# Patient Record
Sex: MALE | Race: BLACK OR AFRICAN AMERICAN | Employment: FULL TIME | URBAN - METROPOLITAN AREA
[De-identification: names, ages, dates, MRNs, and addresses within clinical notes are randomized per-mention and may not be internally consistent; named-entity substitution may affect disease eponyms.]

---

## 2019-08-22 ENCOUNTER — HOSPITAL ENCOUNTER (OUTPATIENT)
Age: 54
Discharge: HOME | End: 2019-08-22
Payer: COMMERCIAL

## 2019-08-22 ENCOUNTER — APPOINTMENT (OUTPATIENT)
Dept: GENERAL RADIOLOGY | Age: 54
End: 2019-08-22
Attending: PHYSICIAN ASSISTANT
Payer: COMMERCIAL

## 2019-08-22 VITALS
DIASTOLIC BLOOD PRESSURE: 87 MMHG | TEMPERATURE: 98 F | SYSTOLIC BLOOD PRESSURE: 152 MMHG | OXYGEN SATURATION: 98 % | WEIGHT: 257 LBS | HEART RATE: 70 BPM | HEIGHT: 71 IN | RESPIRATION RATE: 20 BRPM | BODY MASS INDEX: 35.98 KG/M2

## 2019-08-22 DIAGNOSIS — M25.462 EFFUSION OF LEFT KNEE: Primary | ICD-10-CM

## 2019-08-22 PROCEDURE — 99203 OFFICE O/P NEW LOW 30 MIN: CPT

## 2019-08-22 PROCEDURE — 73562 X-RAY EXAM OF KNEE 3: CPT | Performed by: PHYSICIAN ASSISTANT

## 2019-08-23 NOTE — ED PROVIDER NOTES
Patient Seen in: THE [facility] In [place]    History   Patient presents with:  Knee Pain    Stated Complaint: Left knee pain/2 wks    HPI    47yo male presents to clinic for evaluation of left knee pain x 2 weeks.  Pt reports slipping in showe and time. Skin: Skin is warm and dry. Capillary refill takes less than 2 seconds. Psychiatric: He has a normal mood and affect. Nursing note and vitals reviewed.                 MDM   49yo male presents wit left knee pain x 2 weeks after slipping in s

## 2019-09-29 ENCOUNTER — HOSPITAL ENCOUNTER (EMERGENCY)
Age: 54
Discharge: HOME | End: 2019-09-29
Payer: COMMERCIAL

## 2019-09-29 VITALS
TEMPERATURE: 98 F | WEIGHT: 255 LBS | OXYGEN SATURATION: 98 % | RESPIRATION RATE: 16 BRPM | DIASTOLIC BLOOD PRESSURE: 90 MMHG | HEART RATE: 73 BPM | BODY MASS INDEX: 36 KG/M2 | SYSTOLIC BLOOD PRESSURE: 141 MMHG

## 2019-09-29 DIAGNOSIS — M25.562 ARTHRALGIA OF LEFT KNEE: ICD-10-CM

## 2019-09-29 DIAGNOSIS — S90.111A CONTUSION OF RIGHT GREAT TOE WITHOUT DAMAGE TO NAIL, INITIAL ENCOUNTER: Primary | ICD-10-CM

## 2019-09-29 PROCEDURE — 96372 THER/PROPH/DIAG INJ SC/IM: CPT

## 2019-09-29 PROCEDURE — 99283 EMERGENCY DEPT VISIT LOW MDM: CPT

## 2019-09-29 RX ORDER — KETOROLAC TROMETHAMINE 30 MG/ML
30 INJECTION, SOLUTION INTRAMUSCULAR; INTRAVENOUS ONCE
Status: COMPLETED | OUTPATIENT
Start: 2019-09-29 | End: 2019-09-29

## 2019-09-29 RX ORDER — KETOROLAC TROMETHAMINE 10 MG/1
10 TABLET, FILM COATED ORAL EVERY 6 HOURS PRN
Qty: 30 TABLET | Refills: 0 | Status: SHIPPED | OUTPATIENT
Start: 2019-09-29 | End: 2019-10-06

## 2019-09-29 RX ORDER — METHYLPREDNISOLONE 4 MG/1
TABLET ORAL
Qty: 1 PACKAGE | Refills: 0 | Status: SHIPPED | OUTPATIENT
Start: 2019-09-29 | End: 2019-11-19

## 2019-09-30 NOTE — ED PROVIDER NOTES
Patient Seen in: [facility] [place]      History   Patient presents with:  Lower Extremity Injury (musculoskeletal)    Stated Complaint: toe pain     21-year-old -American male without significant past medical history anup kg/m²         Physical Exam   Constitutional: He appears well-developed and well-nourished. No distress. Musculoskeletal:        Left knee: Normal.        Left foot: There is swelling.  There is normal range of motion, no tenderness, no bony tenderness, n

## 2019-09-30 NOTE — ED INITIAL ASSESSMENT (HPI)
Pt in er for c/o pain to his right great toe and separate pain to his left knee from an injury one month ago

## 2019-11-19 ENCOUNTER — OFFICE VISIT (OUTPATIENT)
Dept: FAMILY MEDICINE CLINIC | Facility: CLINIC | Age: 54
End: 2019-11-19
Payer: COMMERCIAL

## 2019-11-19 VITALS
SYSTOLIC BLOOD PRESSURE: 122 MMHG | WEIGHT: 259.5 LBS | HEART RATE: 80 BPM | BODY MASS INDEX: 36.33 KG/M2 | TEMPERATURE: 99 F | HEIGHT: 71.06 IN | DIASTOLIC BLOOD PRESSURE: 80 MMHG

## 2019-11-19 DIAGNOSIS — M25.562 CHRONIC PAIN OF LEFT KNEE: Primary | ICD-10-CM

## 2019-11-19 DIAGNOSIS — G89.29 CHRONIC PAIN OF LEFT KNEE: Primary | ICD-10-CM

## 2019-11-19 DIAGNOSIS — Z12.11 ENCOUNTER FOR SCREENING FOR MALIGNANT NEOPLASM OF COLON: ICD-10-CM

## 2019-11-19 DIAGNOSIS — N63.22 LUMP IN UPPER INNER QUADRANT OF LEFT BREAST: ICD-10-CM

## 2019-11-19 PROCEDURE — 99203 OFFICE O/P NEW LOW 30 MIN: CPT | Performed by: FAMILY MEDICINE

## 2019-11-19 NOTE — PATIENT INSTRUCTIONS
--ibuprofen 600mg-800mg 3x/day with food consistently for 2-3 days, then only as needed for pain (do not use for prolonged period)    -- heat/ice as needed    -- stretching exercises 2-3x/day    -- continue to wear brace during the day    -- followup in

## 2019-11-19 NOTE — PROGRESS NOTES
Mohit Salazar is a 48year old male here for Patient presents with:  Knee Pain: Left knee pain, patient fell in the shower 3 months. Left knee swelling and pain      HPI:       1.  Chronic pain of left knee  -fell in shower about 3 months ago  -landed with left left breast  CV: RRR, no murmurs  Abd: soft, ND, NT, +BS  Ext: full ROM  Psych: normal affect     ASSESSMENT/PLAN:     1. Chronic pain of left knee  -heat/ice/exercises - suspect strain  -nsaids prn  -brace as needed  -f/u in 1 month, sooner prn    2.  Lump

## 2020-01-08 ENCOUNTER — OFFICE VISIT (OUTPATIENT)
Dept: FAMILY MEDICINE CLINIC | Facility: CLINIC | Age: 55
End: 2020-01-08
Payer: COMMERCIAL

## 2020-01-08 VITALS
DIASTOLIC BLOOD PRESSURE: 70 MMHG | BODY MASS INDEX: 37.1 KG/M2 | HEIGHT: 71.06 IN | TEMPERATURE: 99 F | SYSTOLIC BLOOD PRESSURE: 110 MMHG | OXYGEN SATURATION: 97 % | HEART RATE: 101 BPM | WEIGHT: 265 LBS

## 2020-01-08 DIAGNOSIS — M25.562 CHRONIC PAIN OF LEFT KNEE: Primary | ICD-10-CM

## 2020-01-08 DIAGNOSIS — E66.09 CLASS 2 OBESITY DUE TO EXCESS CALORIES WITHOUT SERIOUS COMORBIDITY WITH BODY MASS INDEX (BMI) OF 36.0 TO 36.9 IN ADULT: ICD-10-CM

## 2020-01-08 DIAGNOSIS — G89.29 CHRONIC PAIN OF LEFT KNEE: Primary | ICD-10-CM

## 2020-01-08 PROCEDURE — 99214 OFFICE O/P EST MOD 30 MIN: CPT | Performed by: FAMILY MEDICINE

## 2020-01-08 NOTE — PATIENT INSTRUCTIONS
Call insurance to find out about coverage for physical and make sure THE [facility] is in network - if not, I can change referral    Start therapy    Continue knee stretching and brace as needed    Continue to work on diet - limit sugars, soda, and carbs (bread,

## 2020-01-08 NOTE — PROGRESS NOTES
Sharyle Rock is a 48year old male here for Patient presents with:  Knee Pain: Follow up, still swelling on and off when active. HPI:       1.  Chronic pain of left knee  -improving  -only gets pain and swelling when over-exerts it  -has been going on sin on lifestyle changes at length  -weight loss will help his knee pain          The patient is asked to return in 1 sally. Orders This Visit:  No orders of the defined types were placed in this encounter.       Meds This Visit:  Requested Prescriptions

## 2020-02-04 PROBLEM — K63.5 COLON POLYP: Status: ACTIVE | Noted: 2020-02-04

## 2020-02-04 PROBLEM — Z12.11 SPECIAL SCREENING FOR MALIGNANT NEOPLASMS, COLON: Status: ACTIVE | Noted: 2020-02-04

## 2020-09-10 ENCOUNTER — VIRTUAL PHONE E/M (OUTPATIENT)
Dept: FAMILY MEDICINE CLINIC | Facility: CLINIC | Age: 55
End: 2020-09-10
Payer: COMMERCIAL

## 2020-09-10 DIAGNOSIS — R93.89 ABNORMAL X-RAY: ICD-10-CM

## 2020-09-10 DIAGNOSIS — M25.562 CHRONIC PAIN OF LEFT KNEE: Primary | ICD-10-CM

## 2020-09-10 DIAGNOSIS — M25.462 SWELLING OF JOINT OF LEFT KNEE: ICD-10-CM

## 2020-09-10 DIAGNOSIS — G89.29 CHRONIC PAIN OF LEFT KNEE: Primary | ICD-10-CM

## 2020-09-10 PROCEDURE — 99443 PHONE E/M BY PHYS 21-30 MIN: CPT | Performed by: FAMILY MEDICINE

## 2020-09-10 NOTE — PATIENT INSTRUCTIONS
Call to schedule physical therapy  If not improving after 3-4 sessions, call to schedule MRI of knee  Followup with me in 3-4 wks, sooner if needed

## 2020-09-10 NOTE — PROGRESS NOTES
Virtual/Telephone Check-In    Christian Evans is a 50year old male here today for a telemedicine audio only visit. Patient understands and accepts financial responsibility for any deductible, co-insurance and/or co-pays associated with this service.     Adalid status: Never Smoker      Smokeless tobacco: Never Used    Alcohol use: Never      Frequency: Never    Drug use: Never         Medications (Active prior to today's visit):  No current outpatient medications on file.        Allergies:  No Known Allergies

## 2020-09-17 ENCOUNTER — HOSPITAL ENCOUNTER (OUTPATIENT)
Dept: MRI IMAGING | Age: 55
Discharge: HOME | End: 2020-09-17
Attending: FAMILY MEDICINE
Payer: COMMERCIAL

## 2020-09-17 DIAGNOSIS — G89.29 CHRONIC PAIN OF LEFT KNEE: ICD-10-CM

## 2020-09-17 DIAGNOSIS — R93.89 ABNORMAL X-RAY: ICD-10-CM

## 2020-09-17 DIAGNOSIS — M25.462 SWELLING OF JOINT OF LEFT KNEE: ICD-10-CM

## 2020-09-17 DIAGNOSIS — M25.562 CHRONIC PAIN OF LEFT KNEE: ICD-10-CM

## 2020-09-17 PROCEDURE — 73721 MRI JNT OF LWR EXTRE W/O DYE: CPT | Performed by: FAMILY MEDICINE

## 2020-09-18 ENCOUNTER — TELEPHONE (OUTPATIENT)
Dept: ORTHOPEDICS CLINIC | Facility: CLINIC | Age: 55
End: 2020-09-18

## 2020-09-18 ENCOUNTER — TELEPHONE (OUTPATIENT)
Dept: FAMILY MEDICINE CLINIC | Facility: CLINIC | Age: 55
End: 2020-09-18

## 2020-09-18 DIAGNOSIS — S83.207A ACUTE TORN MENISCUS OF KNEE, LEFT, INITIAL ENCOUNTER: Primary | ICD-10-CM

## 2020-09-18 NOTE — TELEPHONE ENCOUNTER
Patient scheduled an appointment with you on Monday at 11:45am. Patient only had a MRI done, will additional imaging be needed?

## 2020-09-18 NOTE — TELEPHONE ENCOUNTER
----- Message from Yvan Huizar MD sent at 9/18/2020 11:47 AM CDT -----  MRI shows tear in meniscus     Still recommend he start PT    Also refer to ortho (dr. Omar Lowe, dx: medical meniscal tear in left knee)    Have him see ortho after 3-4 wks of PT

## 2020-09-19 ENCOUNTER — HOSPITAL ENCOUNTER (OUTPATIENT)
Dept: GENERAL RADIOLOGY | Age: 55
Discharge: HOME | End: 2020-09-19
Attending: NURSE PRACTITIONER
Payer: COMMERCIAL

## 2020-09-19 DIAGNOSIS — M25.562 LEFT KNEE PAIN, UNSPECIFIED CHRONICITY: ICD-10-CM

## 2020-09-19 PROCEDURE — 73564 X-RAY EXAM KNEE 4 OR MORE: CPT | Performed by: NURSE PRACTITIONER

## 2020-09-21 ENCOUNTER — OFFICE VISIT (OUTPATIENT)
Dept: ORTHOPEDICS CLINIC | Facility: CLINIC | Age: 55
End: 2020-09-21
Payer: COMMERCIAL

## 2020-09-21 VITALS — OXYGEN SATURATION: 98 % | RESPIRATION RATE: 16 BRPM | HEIGHT: 72 IN | HEART RATE: 95 BPM | BODY MASS INDEX: 35 KG/M2

## 2020-09-21 DIAGNOSIS — M17.12 PRIMARY OSTEOARTHRITIS OF LEFT KNEE: Primary | ICD-10-CM

## 2020-09-21 DIAGNOSIS — S83.222A PERIPHERAL TEAR OF MEDIAL MENISCUS OF LEFT KNEE AS CURRENT INJURY, INITIAL ENCOUNTER: ICD-10-CM

## 2020-09-21 PROCEDURE — 20610 DRAIN/INJ JOINT/BURSA W/O US: CPT | Performed by: NURSE PRACTITIONER

## 2020-09-21 PROCEDURE — 99203 OFFICE O/P NEW LOW 30 MIN: CPT | Performed by: NURSE PRACTITIONER

## 2020-09-21 RX ORDER — TRIAMCINOLONE ACETONIDE 40 MG/ML
40 INJECTION, SUSPENSION INTRA-ARTICULAR; INTRAMUSCULAR ONCE
Status: COMPLETED | OUTPATIENT
Start: 2020-09-21 | End: 2020-09-21

## 2020-09-21 RX ORDER — DICLOFENAC SODIUM 75 MG/1
75 TABLET, DELAYED RELEASE ORAL 2 TIMES DAILY
Qty: 60 TABLET | Refills: 1 | Status: SHIPPED | OUTPATIENT
Start: 2020-09-21 | End: 2021-02-16

## 2020-09-21 RX ADMIN — TRIAMCINOLONE ACETONIDE 40 MG: 40 INJECTION, SUSPENSION INTRA-ARTICULAR; INTRAMUSCULAR at 12:26:00

## 2020-09-21 NOTE — PROCEDURES
After informed consent, the patient's left knee was marked, locally anesthetized with skin refrigerant, prepped with topical antiseptic, and injected with a mixture of 1mL 40mg/mL Kenalog, 2mL 1% lidocaine and 2mL 0.5% marcaine through the inferolateral po

## 2020-09-21 NOTE — PROGRESS NOTES
[facility] New Patient Note    CC: Patient presents with:  Knee Pain: Left knee torn meniscus 2 years ago     HPI: This 50year old male presents today with complaints of left knee pain for the past 2 years.   He states that he was staying at a hotel distally. IMAGING  Radiographs were personally reviewed that show mild arthritic changes in the patellofemoral compartment. MRI was also personally viewed that show a small hole posterior medial meniscus tear.     ASSESSMENT  Left knee medial meniscus t

## 2020-09-23 ENCOUNTER — VIRTUAL PHONE E/M (OUTPATIENT)
Dept: FAMILY MEDICINE CLINIC | Facility: CLINIC | Age: 55
End: 2020-09-23
Payer: COMMERCIAL

## 2020-09-23 DIAGNOSIS — M25.562 CHRONIC PAIN OF LEFT KNEE: ICD-10-CM

## 2020-09-23 DIAGNOSIS — S83.207A ACUTE TORN MENISCUS OF KNEE, LEFT, INITIAL ENCOUNTER: Primary | ICD-10-CM

## 2020-09-23 DIAGNOSIS — G89.29 CHRONIC PAIN OF LEFT KNEE: ICD-10-CM

## 2020-09-23 PROCEDURE — 99443 PHONE E/M BY PHYS 21-30 MIN: CPT | Performed by: FAMILY MEDICINE

## 2020-09-23 NOTE — PROGRESS NOTES
Virtual/Telephone Check-In    Cristóbal Resendez is a 50year old male here today for a telemedicine audio only visit. Patient understands and accepts financial responsibility for any deductible, co-insurance and/or co-pays associated with this service.     Adalid Oral Tab EC Take 1 tablet (75 mg total) by mouth 2 (two) times daily.  60 tablet 1       Allergies:  No Known Allergies      ROS:     --See HPI for relevant ROS  --GEN: Denies  --HEENT: Denies  --RESP: Denies  --CV: Denies  --GI: Denies  --: Denies  --MSK

## 2020-09-24 ENCOUNTER — OFFICE VISIT (OUTPATIENT)
Dept: PHYSICAL THERAPY | Age: 55
End: 2020-09-24
Attending: NURSE PRACTITIONER
Payer: COMMERCIAL

## 2020-09-24 DIAGNOSIS — S83.222A PERIPHERAL TEAR OF MEDIAL MENISCUS OF LEFT KNEE AS CURRENT INJURY, INITIAL ENCOUNTER: ICD-10-CM

## 2020-09-24 DIAGNOSIS — M17.12 PRIMARY OSTEOARTHRITIS OF LEFT KNEE: ICD-10-CM

## 2020-09-24 PROCEDURE — 97161 PT EVAL LOW COMPLEX 20 MIN: CPT

## 2020-09-24 PROCEDURE — 97112 NEUROMUSCULAR REEDUCATION: CPT

## 2020-09-24 NOTE — PROGRESS NOTES
KNEE EVALUATION:   Referring Physician: Dr. Lorri Cantu  Diagnosis: Primary osteoarthritis of left knee   Peripheral tear of medial meniscus of left knee as current injury, initial encounter      Date of Service: 9/24/2020     PATIENT SUMMARY   Osman Curtis is understanding and performs HEP correctly without reported pain. Pt has an increase in HS flexibility with core activation indicating reduced trunk control and neurological component to HS flexibility.  It is medically necessary for pt to continue PT to reac to actively participate in planning and for this course of care. Thank you for your referral. Please co-sign or sign and return this letter via fax as soon as possible to [phone].  If you have any questions, please contact me at Dept: [phone]

## 2020-09-29 ENCOUNTER — OFFICE VISIT (OUTPATIENT)
Dept: PHYSICAL THERAPY | Age: 55
End: 2020-09-29
Attending: NURSE PRACTITIONER
Payer: COMMERCIAL

## 2020-09-29 PROCEDURE — 97110 THERAPEUTIC EXERCISES: CPT

## 2020-09-29 PROCEDURE — 97112 NEUROMUSCULAR REEDUCATION: CPT

## 2020-09-29 PROCEDURE — 97140 MANUAL THERAPY 1/> REGIONS: CPT

## 2020-09-29 NOTE — PROGRESS NOTES
Dx: Primary osteoarthritis of left knee   Peripheral tear of medial meniscus of left knee as current injury, initial encounter [facility] (Authorized # of Visits):  6           Authorizing Physician: Dr. Mary Martinez  Next MD visit: none scheduled

## 2020-10-01 ENCOUNTER — OFFICE VISIT (OUTPATIENT)
Dept: PHYSICAL THERAPY | Age: 55
End: 2020-10-01
Attending: NURSE PRACTITIONER
Payer: COMMERCIAL

## 2020-10-01 PROCEDURE — 97110 THERAPEUTIC EXERCISES: CPT

## 2020-10-01 PROCEDURE — 97140 MANUAL THERAPY 1/> REGIONS: CPT

## 2020-10-01 NOTE — PROGRESS NOTES
Dx: Primary osteoarthritis of left knee   Peripheral tear of medial meniscus of left knee as current injury, initial encounter [facility] (Authorized # of Visits):  6           Authorizing Physician: Dr. Starr Urrutia  Next MD visit: none scheduled therex x2       Total Timed Treatment: 45 min  Total Treatment Time: 45 min

## 2020-10-06 ENCOUNTER — OFFICE VISIT (OUTPATIENT)
Dept: PHYSICAL THERAPY | Age: 55
End: 2020-10-06
Attending: NURSE PRACTITIONER
Payer: COMMERCIAL

## 2020-10-06 PROCEDURE — 97140 MANUAL THERAPY 1/> REGIONS: CPT

## 2020-10-06 PROCEDURE — 97110 THERAPEUTIC EXERCISES: CPT

## 2020-10-06 NOTE — PROGRESS NOTES
Dx: Primary osteoarthritis of left knee   Peripheral tear of medial meniscus of left knee as current injury, initial encounter [facility] (Authorized # of Visits):  6           Authorizing Physician: Dr. Juan Antonio Quiñonez  Next MD visit: none scheduled  F massage   HS STM  Contract relax quad Manual   Quad functional massage   HS STM  myobuddy HS     HEP: prone quad stretch 3x30s, HS stretch 3x30s, SB TA 3sx20, SB bridge 2x10:    Charges: Manual x1, therex x2       Total Timed Treatment: 45 min  Total Treat

## 2020-10-08 ENCOUNTER — TELEPHONE (OUTPATIENT)
Dept: PHYSICAL THERAPY | Age: 55
End: 2020-10-08

## 2020-10-08 ENCOUNTER — APPOINTMENT (OUTPATIENT)
Dept: PHYSICAL THERAPY | Age: 55
End: 2020-10-08
Attending: NURSE PRACTITIONER
Payer: COMMERCIAL

## 2020-10-12 ENCOUNTER — TELEPHONE (OUTPATIENT)
Dept: PHYSICAL THERAPY | Facility: HOSPITAL | Age: 55
End: 2020-10-12

## 2020-10-15 ENCOUNTER — OFFICE VISIT (OUTPATIENT)
Dept: PHYSICAL THERAPY | Age: 55
End: 2020-10-15
Attending: FAMILY MEDICINE
Payer: COMMERCIAL

## 2020-10-15 ENCOUNTER — APPOINTMENT (OUTPATIENT)
Dept: PHYSICAL THERAPY | Age: 55
End: 2020-10-15
Attending: NURSE PRACTITIONER
Payer: COMMERCIAL

## 2020-10-15 PROCEDURE — 97110 THERAPEUTIC EXERCISES: CPT

## 2020-10-15 PROCEDURE — 97140 MANUAL THERAPY 1/> REGIONS: CPT

## 2020-10-15 NOTE — PROGRESS NOTES
Dx: Primary osteoarthritis of left knee   Peripheral tear of medial meniscus of left knee as current injury, initial encounter [facility] (Authorized # of Visits):  6           Authorizing Physician: Dr. Samina Leon  Next MD visit: none scheduled  F 10# 4min Foam roll quads 25x2    - - - Kneeling SB planks 15s x4    Heel raises with quad sets 2x10 (next visit) Shuttle DLP 6c 20x  Lateral cable walks 7# 3x ea  Shuttle DLP 6c 3x10    Manual   Quad functional massage   HS STM Manual   Quad functional mas

## 2020-10-20 ENCOUNTER — OFFICE VISIT (OUTPATIENT)
Dept: PHYSICAL THERAPY | Age: 55
End: 2020-10-20
Attending: FAMILY MEDICINE
Payer: COMMERCIAL

## 2020-10-20 PROCEDURE — 97110 THERAPEUTIC EXERCISES: CPT

## 2020-10-20 PROCEDURE — 97140 MANUAL THERAPY 1/> REGIONS: CPT

## 2020-10-20 NOTE — PROGRESS NOTES
Dx: Primary osteoarthritis of left knee   Peripheral tear of medial meniscus of left knee as current injury, initial encounter [facility] (Authorized # of Visits):  6           Authorizing Physician: Dr. Pacheco All  Next MD visit: none scheduled  F planks 15s x4 Kneeling SB planks 15s x6   Heel raises with quad sets 2x10 (next visit) Shuttle DLP 6c 20x  Lateral cable walks 7# 3x ea  Shuttle DLP 6c 3x10 SB wall squats x10, TRX squats 10x   Manual   Quad functional massage   HS STM Manual   Quad functi

## 2020-10-27 ENCOUNTER — TELEPHONE (OUTPATIENT)
Dept: PHYSICAL THERAPY | Age: 55
End: 2020-10-27

## 2020-10-27 ENCOUNTER — APPOINTMENT (OUTPATIENT)
Dept: PHYSICAL THERAPY | Age: 55
End: 2020-10-27
Attending: FAMILY MEDICINE
Payer: COMMERCIAL

## 2020-11-02 ENCOUNTER — OFFICE VISIT (OUTPATIENT)
Dept: ORTHOPEDICS CLINIC | Facility: CLINIC | Age: 55
End: 2020-11-02
Payer: COMMERCIAL

## 2020-11-02 VITALS — RESPIRATION RATE: 16 BRPM | OXYGEN SATURATION: 99 % | HEIGHT: 72 IN | HEART RATE: 88 BPM | BODY MASS INDEX: 35 KG/M2

## 2020-11-02 DIAGNOSIS — S83.222A PERIPHERAL TEAR OF MEDIAL MENISCUS OF LEFT KNEE AS CURRENT INJURY, INITIAL ENCOUNTER: ICD-10-CM

## 2020-11-02 DIAGNOSIS — M17.12 PRIMARY OSTEOARTHRITIS OF LEFT KNEE: Primary | ICD-10-CM

## 2020-11-02 PROCEDURE — 99213 OFFICE O/P EST LOW 20 MIN: CPT | Performed by: NURSE PRACTITIONER

## 2020-11-02 NOTE — PROGRESS NOTES
[facility] Progress Note    CC: Patient presents with: Follow - Up: Left knee pain     HPI: This 50year old male presents today to follow-up on his left knee.   At his previous appointment we gave him a corticosteroid injection he states that this g

## 2020-11-03 ENCOUNTER — OFFICE VISIT (OUTPATIENT)
Dept: PHYSICAL THERAPY | Age: 55
End: 2020-11-03
Attending: FAMILY MEDICINE
Payer: COMMERCIAL

## 2020-11-03 PROCEDURE — 97110 THERAPEUTIC EXERCISES: CPT

## 2020-11-03 PROCEDURE — 97140 MANUAL THERAPY 1/> REGIONS: CPT

## 2020-11-03 NOTE — PROGRESS NOTES
Dx: Primary osteoarthritis of left knee   Peripheral tear of medial meniscus of left knee as current injury, initial encounter [facility] (Authorized # of Visits):  6           Authorizing Physician: Dr. Pacheco All  Next MD visit: none scheduled  F TF distraction with ankle weight 5# 4min TF distraction with ankle weight 10# 4min TF distraction with ankle weight 10# 4min Foam roll quads 25x2 Foam roll quads 25x2 Foam roll quads 25x2   - - - Kneeling SB planks 15s x4 Kneeling SB planks 15s x6 Kneeli

## 2020-11-16 ENCOUNTER — OFFICE VISIT (OUTPATIENT)
Dept: PHYSICAL THERAPY | Age: 55
End: 2020-11-16
Attending: FAMILY MEDICINE
Payer: COMMERCIAL

## 2020-11-16 PROCEDURE — 97112 NEUROMUSCULAR REEDUCATION: CPT

## 2020-11-16 NOTE — PROGRESS NOTES
Discharge Summary  Pt has attended 8 visits in Physical Therapy.        Dx: Primary osteoarthritis of left knee   Peripheral tear of medial meniscus of left knee as current injury, initial encounter [employer] [employer] (Authorized # of Visits):  8 1 hour. NOT MET Pt states he can drive for 45 min. Pt will increase quad and HS flexibility to min restrict to pivot while walking. NOT MET      Plan: Discharge, pt encouraged to follow up with MD and will continue HEP independently at this time.      Yolis Escobar Shuttle DLP 6c 20x  Lateral cable walks 7# 3x ea  Shuttle DLP 6c 3x10 SB wall squats x10, TRX squats 10x SB wall squats x10, TRX squats 10x Reassessment and pt education x25 min   Manual   Quad functional massage   HS STM Manual   Quad functional massage

## 2020-12-07 ENCOUNTER — HOSPITAL ENCOUNTER (OUTPATIENT)
Dept: GENERAL RADIOLOGY | Age: 55
Discharge: HOME | End: 2020-12-07
Attending: NURSE PRACTITIONER
Payer: OTHER MISCELLANEOUS

## 2020-12-07 ENCOUNTER — OFFICE VISIT (OUTPATIENT)
Dept: ORTHOPEDICS CLINIC | Facility: CLINIC | Age: 55
End: 2020-12-07
Payer: COMMERCIAL

## 2020-12-07 VITALS — HEIGHT: 78 IN | OXYGEN SATURATION: 98 % | HEART RATE: 63 BPM | RESPIRATION RATE: 16 BRPM | BODY MASS INDEX: 30 KG/M2

## 2020-12-07 DIAGNOSIS — M25.561 RIGHT KNEE PAIN, UNSPECIFIED CHRONICITY: Primary | ICD-10-CM

## 2020-12-07 DIAGNOSIS — M23.91 ACUTE INTERNAL DERANGEMENT OF RIGHT KNEE: ICD-10-CM

## 2020-12-07 DIAGNOSIS — S83.241A TEAR OF MEDIAL MENISCUS OF RIGHT KNEE, CURRENT, UNSPECIFIED TEAR TYPE, INITIAL ENCOUNTER: ICD-10-CM

## 2020-12-07 DIAGNOSIS — M25.561 RIGHT KNEE PAIN, UNSPECIFIED CHRONICITY: ICD-10-CM

## 2020-12-07 PROCEDURE — 20610 DRAIN/INJ JOINT/BURSA W/O US: CPT | Performed by: NURSE PRACTITIONER

## 2020-12-07 PROCEDURE — 99213 OFFICE O/P EST LOW 20 MIN: CPT | Performed by: NURSE PRACTITIONER

## 2020-12-07 PROCEDURE — 73564 X-RAY EXAM KNEE 4 OR MORE: CPT | Performed by: NURSE PRACTITIONER

## 2020-12-07 RX ORDER — TRIAMCINOLONE ACETONIDE 40 MG/ML
40 INJECTION, SUSPENSION INTRA-ARTICULAR; INTRAMUSCULAR ONCE
Status: COMPLETED | OUTPATIENT
Start: 2020-12-07 | End: 2020-12-07

## 2020-12-07 RX ADMIN — TRIAMCINOLONE ACETONIDE 40 MG: 40 INJECTION, SUSPENSION INTRA-ARTICULAR; INTRAMUSCULAR at 12:21:00

## 2020-12-07 NOTE — PROGRESS NOTES
[facility] New Patient Note    CC: Patient presents with:  Knee Pain: Right knee pain     HPI: This 50year old male presents today with complaints of right knee pain after an injury on 12/1/2020 while he was at work.   He states he was loading boxes neurovascular intact distally.     IMAGING  Radiographs were personally reviewed today that were negative for an acute fracture and he does have mild amount of degenerative changes in the medial and patellofemoral compartment    ASSESSMENT  Right knee inter

## 2021-01-25 ENCOUNTER — OFFICE VISIT (OUTPATIENT)
Dept: ORTHOPEDICS CLINIC | Facility: CLINIC | Age: 56
End: 2021-01-25
Payer: COMMERCIAL

## 2021-01-25 VITALS — RESPIRATION RATE: 16 BRPM | HEIGHT: 78 IN | HEART RATE: 60 BPM | BODY MASS INDEX: 30 KG/M2 | OXYGEN SATURATION: 99 %

## 2021-01-25 DIAGNOSIS — S83.241A TEAR OF MEDIAL MENISCUS OF RIGHT KNEE, CURRENT, UNSPECIFIED TEAR TYPE, INITIAL ENCOUNTER: Primary | ICD-10-CM

## 2021-01-25 DIAGNOSIS — M25.561 RIGHT KNEE PAIN, UNSPECIFIED CHRONICITY: ICD-10-CM

## 2021-01-25 DIAGNOSIS — M23.91 ACUTE INTERNAL DERANGEMENT OF RIGHT KNEE: ICD-10-CM

## 2021-01-25 PROCEDURE — 99213 OFFICE O/P EST LOW 20 MIN: CPT | Performed by: NURSE PRACTITIONER

## 2021-01-25 NOTE — PROGRESS NOTES
[facility] Progress Note    CC: Patient presents with:  Knee Pain: RIGHT KNEE PAIN     HPI: This 50year old male presents today for follow-up on his right knee pain.   We have previously gave him a corticosteroid injection which he states gave him 7 He is tried conservative treatment in the form of anti-inflammatory medicine, physical therapy, and corticosteroid injection.   Unfortunately he is recalcitrant to nonoperative treatment and at this point I would recommend he be evaluated by Dr. Dao Langford for fu

## 2021-02-01 ENCOUNTER — OFFICE VISIT (OUTPATIENT)
Dept: ORTHOPEDICS CLINIC | Facility: CLINIC | Age: 56
End: 2021-02-01
Payer: COMMERCIAL

## 2021-02-01 DIAGNOSIS — S83.241A TEAR OF MEDIAL MENISCUS OF RIGHT KNEE, CURRENT, UNSPECIFIED TEAR TYPE, INITIAL ENCOUNTER: Primary | ICD-10-CM

## 2021-02-01 DIAGNOSIS — M94.261 CHONDROMALACIA, KNEE, RIGHT: ICD-10-CM

## 2021-02-01 PROCEDURE — 99213 OFFICE O/P EST LOW 20 MIN: CPT | Performed by: ORTHOPAEDIC SURGERY

## 2021-02-01 NOTE — PROGRESS NOTES
[facility] New Patient consult note    CC: Patient presents with:  Knee Pain: right knee pain/xrays and MRI at THE [facility]    HPI: The patient is a 50year old male [employer] employee who presents today with complaints of persistent and limiting right k There were no vitals taken for this visit. She is afebrile on Covid screening. On examination he is a 62-year-old black male who weighs 261 pounds. Gait is nonantalgic and a soft Montejo brace is removed from the right knee.   There is no apparent effu including but not limited to possible cardiac, pulmonary, or cerebrovascular complications, any of which could be life-threatening. Given the patients good health history, the risk of the serious complications is felt to be low but not zero.   Pre-operativ

## 2021-02-03 ENCOUNTER — TELEPHONE (OUTPATIENT)
Dept: ORTHOPEDICS CLINIC | Facility: CLINIC | Age: 56
End: 2021-02-03

## 2021-02-03 ENCOUNTER — MED REC SCAN ONLY (OUTPATIENT)
Dept: ORTHOPEDICS CLINIC | Facility: CLINIC | Age: 56
End: 2021-02-03

## 2021-02-03 NOTE — TELEPHONE ENCOUNTER
Patient called in regards surgery. Patient would like  to be re- scheduled for surgery if possible. Patient would like something sooner. Please call patient to discuss surgery.

## 2021-02-16 ENCOUNTER — OFFICE VISIT (OUTPATIENT)
Dept: FAMILY MEDICINE CLINIC | Facility: CLINIC | Age: 56
End: 2021-02-16
Payer: OTHER MISCELLANEOUS

## 2021-02-16 VITALS
SYSTOLIC BLOOD PRESSURE: 98 MMHG | WEIGHT: 240 LBS | HEART RATE: 88 BPM | BODY MASS INDEX: 33.23 KG/M2 | TEMPERATURE: 98 F | HEIGHT: 71.1 IN | DIASTOLIC BLOOD PRESSURE: 60 MMHG | OXYGEN SATURATION: 98 %

## 2021-02-16 DIAGNOSIS — R73.9 HYPERGLYCEMIA: ICD-10-CM

## 2021-02-16 DIAGNOSIS — S89.91XA INJURY OF RIGHT KNEE, INITIAL ENCOUNTER: ICD-10-CM

## 2021-02-16 DIAGNOSIS — Z12.5 PROSTATE CANCER SCREENING: ICD-10-CM

## 2021-02-16 DIAGNOSIS — Z01.818 PREOPERATIVE CLEARANCE: Primary | ICD-10-CM

## 2021-02-16 DIAGNOSIS — Z00.00 ROUTINE GENERAL MEDICAL EXAMINATION AT A HEALTH CARE FACILITY: Primary | ICD-10-CM

## 2021-02-16 DIAGNOSIS — R71.8 LOW MEAN CORPUSCULAR VOLUME (MCV): ICD-10-CM

## 2021-02-16 PROCEDURE — 3078F DIAST BP <80 MM HG: CPT | Performed by: FAMILY MEDICINE

## 2021-02-16 PROCEDURE — 93000 ELECTROCARDIOGRAM COMPLETE: CPT | Performed by: FAMILY MEDICINE

## 2021-02-16 PROCEDURE — 3074F SYST BP LT 130 MM HG: CPT | Performed by: FAMILY MEDICINE

## 2021-02-16 PROCEDURE — 3008F BODY MASS INDEX DOCD: CPT | Performed by: FAMILY MEDICINE

## 2021-02-16 PROCEDURE — 99243 OFF/OP CNSLTJ NEW/EST LOW 30: CPT | Performed by: FAMILY MEDICINE

## 2021-02-16 RX ORDER — DICLOFENAC SODIUM 75 MG/1
75 TABLET, DELAYED RELEASE ORAL 2 TIMES DAILY PRN
Qty: 60 TABLET | Refills: 1 | Status: SHIPPED | OUTPATIENT
Start: 2021-02-16 | End: 2021-04-12

## 2021-02-16 NOTE — PATIENT INSTRUCTIONS
Go to [facility] for fasting bloodwork    Stop diclofenac 1 week before surgery    Call surgeon with any questions    Followup with me 4 wks after surgery

## 2021-02-16 NOTE — PROGRESS NOTES
Patient presents with:  Pre-Op Exam: On 03/01/2021 patient is having Right knee arthroscopy with partial medial meniscectomy and chondroplasty with Jay Jay Hobbs       PREOPERATIVE HISTORY AND PHYSICAL     Pre-op clearance requested by:  Dr. Syed Hope  Proposed s Relationships      Social connections        Talks on phone: Not on file        Gets together: Not on file        Attends Anabaptism service: Not on file        Active member of club or organization: Not on file        Attends meetings of clubs or organizat diabetes - will address this prior to full surgical clearance  -EKG unremarkable      Will fax note to criss and Dr. Pio Strong

## 2021-02-19 PROCEDURE — 3046F HEMOGLOBIN A1C LEVEL >9.0%: CPT | Performed by: FAMILY MEDICINE

## 2021-02-22 ENCOUNTER — TELEPHONE (OUTPATIENT)
Dept: ORTHOPEDICS CLINIC | Facility: CLINIC | Age: 56
End: 2021-02-22

## 2021-02-22 LAB — TOTAL PSA: 0.4 NG/ML

## 2021-02-22 NOTE — TELEPHONE ENCOUNTER
Spoke with Tera Fuentes and let him know that the hospital will call and coordinate this to be done about 72 hours in advance to surgery.

## 2021-02-22 NOTE — TELEPHONE ENCOUNTER
Pt has RT KNEE SX w/ [address] 03.01 and is wanting to confirm how to schedule covid test since he has not been contacted yet.     Pt can be reached @ [phone]

## 2021-02-22 NOTE — TELEPHONE ENCOUNTER
Surgeon: Dr. Ada Urias  Date of Surgery: 3/2/2021  Facility: [facility]       If [facility], scheduling sheet to referral team?: n/a  Clearance needed: yes       If yes, requested?: yes  Post-op Appt: 3/11/2021 4:00PM

## 2021-02-23 ENCOUNTER — TELEPHONE (OUTPATIENT)
Dept: FAMILY MEDICINE CLINIC | Facility: CLINIC | Age: 56
End: 2021-02-23

## 2021-02-23 LAB
% SATURATION: 37 % (CALC) (ref 20–48)
ABSOLUTE BASOPHILS: 32 CELLS/UL (ref 0–200)
ABSOLUTE EOSINOPHILS: 69 CELLS/UL (ref 15–500)
ABSOLUTE LYMPHOCYTES: 2056 CELLS/UL (ref 850–3900)
ABSOLUTE MONOCYTES: 472 CELLS/UL (ref 200–950)
ABSOLUTE NEUTROPHILS: 2671 CELLS/UL (ref 1500–7800)
ALBUMIN/GLOBULIN RATIO: 1.4 (CALC) (ref 1–2.5)
ALBUMIN: 4.2 G/DL (ref 3.6–5.1)
ALKALINE PHOSPHATASE: 75 U/L (ref 35–144)
ALT: 10 U/L (ref 9–46)
AST: 10 U/L (ref 10–35)
BASOPHILS: 0.6 %
BILIRUBIN, TOTAL: 0.7 MG/DL (ref 0.2–1.2)
BUN: 12 MG/DL (ref 7–25)
CALCIUM: 10.3 MG/DL (ref 8.6–10.3)
CARBON DIOXIDE: 32 MMOL/L (ref 20–32)
CHLORIDE: 100 MMOL/L (ref 98–110)
CHOL/HDLC RATIO: 5.3 (CALC)
CHOLESTEROL, TOTAL: 192 MG/DL
CREATININE: 1.16 MG/DL (ref 0.7–1.33)
EGFR IF AFRICN AM: 83 ML/MIN/1.73M2
EGFR IF NONAFRICN AM: 71 ML/MIN/1.73M2
EOSINOPHILS: 1.3 %
FERRITIN: 190 NG/ML (ref 38–380)
GLOBULIN: 3.1 G/DL (CALC) (ref 1.9–3.7)
GLUCOSE: 226 MG/DL (ref 65–99)
HDL CHOLESTEROL: 36 MG/DL
HEMATOCRIT: 42.2 % (ref 38.5–50)
HEMOGLOBIN A1C: 11.4 % OF TOTAL HGB
HEMOGLOBIN: 13.5 G/DL (ref 13.2–17.1)
IRON BINDING CAPACITY: 336 MCG/DL (CALC) (ref 250–425)
IRON, TOTAL: 125 MCG/DL (ref 50–180)
LDL-CHOLESTEROL: 137 MG/DL (CALC)
LYMPHOCYTES: 38.8 %
MCH: 23.6 PG (ref 27–33)
MCHC: 32 G/DL (ref 32–36)
MCV: 73.9 FL (ref 80–100)
MONOCYTES: 8.9 %
MPV: 10.6 FL (ref 7.5–12.5)
NEUTROPHILS: 50.4 %
NON-HDL CHOLESTEROL: 156 MG/DL (CALC)
PLATELET COUNT: 288 THOUSAND/UL (ref 140–400)
POTASSIUM: 4.3 MMOL/L (ref 3.5–5.3)
PROTEIN, TOTAL: 7.3 G/DL (ref 6.1–8.1)
RDW: 13.7 % (ref 11–15)
RED BLOOD CELL COUNT: 5.71 MILLION/UL (ref 4.2–5.8)
SODIUM: 137 MMOL/L (ref 135–146)
TRIGLYCERIDES: 91 MG/DL
TSH W/REFLEX TO FT4: 1.94 MIU/L (ref 0.4–4.5)
WHITE BLOOD CELL COUNT: 5.3 THOUSAND/UL (ref 3.8–10.8)

## 2021-02-23 RX ORDER — LANCETS 28 GAUGE
1 EACH MISCELLANEOUS DAILY
Qty: 1 BOX | Refills: 1 | Status: SHIPPED | OUTPATIENT
Start: 2021-02-23 | End: 2021-03-15

## 2021-02-23 RX ORDER — BLOOD-GLUCOSE METER
1 KIT MISCELLANEOUS DAILY
Qty: 1 KIT | Refills: 0 | Status: SHIPPED | OUTPATIENT
Start: 2021-02-23 | End: 2022-02-23

## 2021-02-23 RX ORDER — METFORMIN HYDROCHLORIDE 500 MG/1
TABLET, EXTENDED RELEASE ORAL
Qty: 180 TABLET | Refills: 1 | Status: SHIPPED | OUTPATIENT
Start: 2021-02-23 | End: 2021-03-11

## 2021-02-23 RX ORDER — BLOOD-GLUCOSE METER
KIT MISCELLANEOUS
Qty: 100 STRIP | Refills: 0 | Status: SHIPPED | OUTPATIENT
Start: 2021-02-23 | End: 2021-03-15

## 2021-02-23 NOTE — TELEPHONE ENCOUNTER
Called patient regarding new diagnosis of DM - elevated sugars on preop screening    A1c came back at 11.4    Plan:   -he will cut out soda and sugars  -limit carbs as much as possible  -increase activity  -start metformin as prescribed  -check fasting sug

## 2021-02-26 ENCOUNTER — TELEPHONE (OUTPATIENT)
Dept: ORTHOPEDICS CLINIC | Facility: CLINIC | Age: 56
End: 2021-02-26

## 2021-02-26 NOTE — TELEPHONE ENCOUNTER
Mona Ogden from [employer] called regarding pre-auth for patient's surgery. She is also looking for medical records as she has not received any from patient's visits. Patient WC claim #[id number], date of injury is 12/2/20.

## 2021-03-11 ENCOUNTER — TELEMEDICINE (OUTPATIENT)
Dept: FAMILY MEDICINE CLINIC | Facility: CLINIC | Age: 56
End: 2021-03-11
Payer: COMMERCIAL

## 2021-03-11 DIAGNOSIS — E11.9 TYPE 2 DIABETES MELLITUS WITHOUT COMPLICATION, WITHOUT LONG-TERM CURRENT USE OF INSULIN (HCC): Primary | ICD-10-CM

## 2021-03-11 DIAGNOSIS — G89.29 CHRONIC PAIN OF LEFT KNEE: ICD-10-CM

## 2021-03-11 DIAGNOSIS — M25.562 CHRONIC PAIN OF LEFT KNEE: ICD-10-CM

## 2021-03-11 DIAGNOSIS — E66.09 CLASS 2 OBESITY DUE TO EXCESS CALORIES WITHOUT SERIOUS COMORBIDITY WITH BODY MASS INDEX (BMI) OF 36.0 TO 36.9 IN ADULT: ICD-10-CM

## 2021-03-11 PROCEDURE — 99214 OFFICE O/P EST MOD 30 MIN: CPT | Performed by: FAMILY MEDICINE

## 2021-03-11 RX ORDER — METFORMIN HYDROCHLORIDE 750 MG/1
1500 TABLET, EXTENDED RELEASE ORAL
Qty: 180 TABLET | Refills: 1 | Status: SHIPPED | OUTPATIENT
Start: 2021-03-11 | End: 2021-11-18

## 2021-03-11 NOTE — PROGRESS NOTES
Virtual/Telephone Check-In    Pastor Post is a 50year old male here today for a telemedicine audio and video visit. HPI:       1.  Type 2 diabetes mellitus without complication, without long-term current use of insulin (HCC)  2. Class 2 obesity Attack Maternal Grandmother    • Stroke Maternal Grandmother    • Diabetes Paternal Grandfather       Social History: Social History    Tobacco Use      Smoking status: Never Smoker      Smokeless tobacco: Never Used    Vaping Use      Vaping Use: Never us of the limitations of the telehealth visit, including treatment limitations as no physical exam could be performed. The patient was advised to call 911 or to go to the ER in case there was an emergency.   The patient was also advised of the potential priva

## 2021-03-15 ENCOUNTER — TELEPHONE (OUTPATIENT)
Dept: FAMILY MEDICINE CLINIC | Facility: CLINIC | Age: 56
End: 2021-03-15

## 2021-03-15 RX ORDER — BLOOD-GLUCOSE METER
KIT MISCELLANEOUS
Qty: 100 STRIP | Refills: 1 | Status: SHIPPED | OUTPATIENT
Start: 2021-03-15

## 2021-03-15 RX ORDER — LANCETS 28 GAUGE
1 EACH MISCELLANEOUS DAILY
Qty: 1 BOX | Refills: 1 | Status: SHIPPED | OUTPATIENT
Start: 2021-03-15 | End: 2022-03-15

## 2021-03-15 NOTE — TELEPHONE ENCOUNTER
Pt called states he thought on 3/11 he was getting a refill on his freestyle lancets and strips.  Pt states he only has two strips left and about 10 lancets needs refills sent to his [facility].

## 2021-04-01 ENCOUNTER — MED REC SCAN ONLY (OUTPATIENT)
Dept: ORTHOPEDICS CLINIC | Facility: CLINIC | Age: 56
End: 2021-04-01

## 2021-04-05 ENCOUNTER — TELEPHONE (OUTPATIENT)
Dept: ORTHOPEDICS CLINIC | Facility: CLINIC | Age: 56
End: 2021-04-05

## 2021-04-05 ENCOUNTER — OFFICE VISIT (OUTPATIENT)
Dept: FAMILY MEDICINE CLINIC | Facility: CLINIC | Age: 56
End: 2021-04-05
Payer: COMMERCIAL

## 2021-04-05 VITALS
HEART RATE: 80 BPM | HEIGHT: 71 IN | OXYGEN SATURATION: 98 % | WEIGHT: 234 LBS | BODY MASS INDEX: 32.76 KG/M2 | TEMPERATURE: 97 F | SYSTOLIC BLOOD PRESSURE: 100 MMHG | DIASTOLIC BLOOD PRESSURE: 60 MMHG

## 2021-04-05 DIAGNOSIS — E11.9 TYPE 2 DIABETES MELLITUS WITHOUT COMPLICATION, WITHOUT LONG-TERM CURRENT USE OF INSULIN (HCC): Primary | ICD-10-CM

## 2021-04-05 DIAGNOSIS — G89.29 CHRONIC PAIN OF LEFT KNEE: ICD-10-CM

## 2021-04-05 DIAGNOSIS — M25.562 CHRONIC PAIN OF LEFT KNEE: ICD-10-CM

## 2021-04-05 DIAGNOSIS — S89.91XA INJURY OF RIGHT KNEE, INITIAL ENCOUNTER: ICD-10-CM

## 2021-04-05 PROCEDURE — 99214 OFFICE O/P EST MOD 30 MIN: CPT | Performed by: FAMILY MEDICINE

## 2021-04-05 PROCEDURE — 3008F BODY MASS INDEX DOCD: CPT | Performed by: FAMILY MEDICINE

## 2021-04-05 PROCEDURE — 3074F SYST BP LT 130 MM HG: CPT | Performed by: FAMILY MEDICINE

## 2021-04-05 PROCEDURE — 3078F DIAST BP <80 MM HG: CPT | Performed by: FAMILY MEDICINE

## 2021-04-05 NOTE — PROGRESS NOTES
Delia De Jesus is a 50year old male here for Patient presents with:  Diabetes      HPI:       1.  Type 2 diabetes mellitus without complication, without long-term current use of insulin (HCC)  -has lost an additional 6 lbs since last visit  -continues t Allergies:  No Known Allergies      ROS:   See HPI for relevant ROS    --GEN: No other complaints  --HEENT: No other complaints  --RESP: No other complaints  --CV: No other complaints  --GI: No other complaints  --: No other complaints  --MSK: No o Referrals:  Katerine Adames MD    I spent a total of 30 minutes, more than half of which was spent counseling/coordinating care regarding DM

## 2021-04-05 NOTE — PATIENT INSTRUCTIONS
Call surgeon's office to reschedule surgery    Call [employer] comp office to update them    Schedule appt with eye doctor    Followup with me 4-6 wks after surger

## 2021-04-06 ENCOUNTER — TELEPHONE (OUTPATIENT)
Dept: ORTHOPEDICS CLINIC | Facility: CLINIC | Age: 56
End: 2021-04-06

## 2021-04-06 NOTE — TELEPHONE ENCOUNTER
Surgeon: Dr. Dc Santoyo  Date of Surgery: 4/27/2021  Facility[facility], scheduling sheet to referral team?: na  Clearance needed: yes       If yes, requested?: yes  Post-op Appt: 5/6/2021 3:40pm

## 2021-04-06 NOTE — TELEPHONE ENCOUNTER
LMOM for WC adj, Talia regarding status of SX auth    Please give call to [facility] or myself.

## 2021-04-07 RX ORDER — ACETAMINOPHEN 500 MG
1000 TABLET ORAL ONCE
Status: CANCELLED | OUTPATIENT
Start: 2021-04-07 | End: 2021-04-07

## 2021-04-10 DIAGNOSIS — M25.562 CHRONIC PAIN OF LEFT KNEE: Primary | ICD-10-CM

## 2021-04-10 DIAGNOSIS — G89.29 CHRONIC PAIN OF LEFT KNEE: Primary | ICD-10-CM

## 2021-04-12 RX ORDER — DICLOFENAC SODIUM 75 MG/1
75 TABLET, DELAYED RELEASE ORAL 2 TIMES DAILY PRN
Qty: 60 TABLET | Refills: 0 | Status: SHIPPED | OUTPATIENT
Start: 2021-04-12 | End: 2021-05-10

## 2021-04-12 NOTE — TELEPHONE ENCOUNTER
Diclofenac Sodium 75 MG Oral Tab EC 60 tablet 1 2/16/2021     LOV 4/5/21  The patient is asked to return in 4-6 wks post op  SX date 4/27  Has future OV 5/6/21.      Refill approved

## 2021-04-24 ENCOUNTER — LAB ENCOUNTER (OUTPATIENT)
Dept: LAB | Facility: HOSPITAL | Age: 56
End: 2021-04-24
Attending: ORTHOPAEDIC SURGERY
Payer: COMMERCIAL

## 2021-04-24 DIAGNOSIS — M94.261 CHONDROMALACIA, KNEE, RIGHT: ICD-10-CM

## 2021-04-27 ENCOUNTER — ANESTHESIA EVENT (OUTPATIENT)
Dept: SURGERY | Facility: HOSPITAL | Age: 56
End: 2021-04-27
Payer: OTHER MISCELLANEOUS

## 2021-04-27 ENCOUNTER — HOSPITAL ENCOUNTER (OUTPATIENT)
Facility: HOSPITAL | Age: 56
Setting detail: HOSPITAL OUTPATIENT SURGERY
Discharge: HOME | End: 2021-04-27
Attending: ORTHOPAEDIC SURGERY | Admitting: ORTHOPAEDIC SURGERY
Payer: OTHER MISCELLANEOUS

## 2021-04-27 ENCOUNTER — ANESTHESIA (OUTPATIENT)
Dept: SURGERY | Facility: HOSPITAL | Age: 56
End: 2021-04-27
Payer: OTHER MISCELLANEOUS

## 2021-04-27 VITALS
SYSTOLIC BLOOD PRESSURE: 145 MMHG | WEIGHT: 238.13 LBS | DIASTOLIC BLOOD PRESSURE: 89 MMHG | OXYGEN SATURATION: 100 % | BODY MASS INDEX: 33.34 KG/M2 | RESPIRATION RATE: 16 BRPM | HEART RATE: 69 BPM | HEIGHT: 71 IN | TEMPERATURE: 98 F

## 2021-04-27 DIAGNOSIS — S83.241A TEAR OF MEDIAL MENISCUS OF RIGHT KNEE, CURRENT, UNSPECIFIED TEAR TYPE, INITIAL ENCOUNTER: ICD-10-CM

## 2021-04-27 DIAGNOSIS — M94.261 CHONDROMALACIA, KNEE, RIGHT: Primary | ICD-10-CM

## 2021-04-27 PROCEDURE — 82962 GLUCOSE BLOOD TEST: CPT

## 2021-04-27 PROCEDURE — 0SBC4ZZ EXCISION OF RIGHT KNEE JOINT, PERCUTANEOUS ENDOSCOPIC APPROACH: ICD-10-PCS | Performed by: PHYSICIAN ASSISTANT

## 2021-04-27 RX ORDER — SODIUM CHLORIDE, SODIUM LACTATE, POTASSIUM CHLORIDE, CALCIUM CHLORIDE 600; 310; 30; 20 MG/100ML; MG/100ML; MG/100ML; MG/100ML
INJECTION, SOLUTION INTRAVENOUS CONTINUOUS
Status: DISCONTINUED | OUTPATIENT
Start: 2021-04-27 | End: 2021-04-27

## 2021-04-27 RX ORDER — MEPERIDINE HYDROCHLORIDE 25 MG/ML
INJECTION INTRAMUSCULAR; INTRAVENOUS; SUBCUTANEOUS
Status: COMPLETED
Start: 2021-04-27 | End: 2021-04-27

## 2021-04-27 RX ORDER — HYDROCODONE BITARTRATE AND ACETAMINOPHEN 5; 325 MG/1; MG/1
1 TABLET ORAL AS NEEDED
Status: DISCONTINUED | OUTPATIENT
Start: 2021-04-27 | End: 2021-04-27

## 2021-04-27 RX ORDER — NALOXONE HYDROCHLORIDE 0.4 MG/ML
80 INJECTION, SOLUTION INTRAMUSCULAR; INTRAVENOUS; SUBCUTANEOUS AS NEEDED
Status: DISCONTINUED | OUTPATIENT
Start: 2021-04-27 | End: 2021-04-27

## 2021-04-27 RX ORDER — ACETAMINOPHEN 500 MG
1000 TABLET ORAL ONCE
COMMUNITY
End: 2021-11-17

## 2021-04-27 RX ORDER — ONDANSETRON 2 MG/ML
INJECTION INTRAMUSCULAR; INTRAVENOUS AS NEEDED
Status: DISCONTINUED | OUTPATIENT
Start: 2021-04-27 | End: 2021-04-27 | Stop reason: SURG

## 2021-04-27 RX ORDER — DEXAMETHASONE SODIUM PHOSPHATE 4 MG/ML
VIAL (ML) INJECTION AS NEEDED
Status: DISCONTINUED | OUTPATIENT
Start: 2021-04-27 | End: 2021-04-27 | Stop reason: SURG

## 2021-04-27 RX ORDER — LIDOCAINE HYDROCHLORIDE 10 MG/ML
INJECTION, SOLUTION EPIDURAL; INFILTRATION; INTRACAUDAL; PERINEURAL AS NEEDED
Status: DISCONTINUED | OUTPATIENT
Start: 2021-04-27 | End: 2021-04-27 | Stop reason: SURG

## 2021-04-27 RX ORDER — ONDANSETRON 2 MG/ML
4 INJECTION INTRAMUSCULAR; INTRAVENOUS AS NEEDED
Status: DISCONTINUED | OUTPATIENT
Start: 2021-04-27 | End: 2021-04-27

## 2021-04-27 RX ORDER — KETOROLAC TROMETHAMINE 30 MG/ML
INJECTION, SOLUTION INTRAMUSCULAR; INTRAVENOUS AS NEEDED
Status: DISCONTINUED | OUTPATIENT
Start: 2021-04-27 | End: 2021-04-27 | Stop reason: SURG

## 2021-04-27 RX ORDER — MIDAZOLAM HYDROCHLORIDE 1 MG/ML
1 INJECTION INTRAMUSCULAR; INTRAVENOUS EVERY 5 MIN PRN
Status: DISCONTINUED | OUTPATIENT
Start: 2021-04-27 | End: 2021-04-27

## 2021-04-27 RX ORDER — BUPIVACAINE HYDROCHLORIDE AND EPINEPHRINE 2.5; 5 MG/ML; UG/ML
INJECTION, SOLUTION EPIDURAL; INFILTRATION; INTRACAUDAL; PERINEURAL AS NEEDED
Status: DISCONTINUED | OUTPATIENT
Start: 2021-04-27 | End: 2021-04-27 | Stop reason: HOSPADM

## 2021-04-27 RX ORDER — METOCLOPRAMIDE HYDROCHLORIDE 5 MG/ML
10 INJECTION INTRAMUSCULAR; INTRAVENOUS AS NEEDED
Status: DISCONTINUED | OUTPATIENT
Start: 2021-04-27 | End: 2021-04-27

## 2021-04-27 RX ORDER — POVIDONE-IODINE 10 MG/G
OINTMENT TOPICAL AS NEEDED
Status: DISCONTINUED | OUTPATIENT
Start: 2021-04-27 | End: 2021-04-27 | Stop reason: HOSPADM

## 2021-04-27 RX ORDER — MEPERIDINE HYDROCHLORIDE 25 MG/ML
12.5 INJECTION INTRAMUSCULAR; INTRAVENOUS; SUBCUTANEOUS AS NEEDED
Status: COMPLETED | OUTPATIENT
Start: 2021-04-27 | End: 2021-04-27

## 2021-04-27 RX ORDER — CEFAZOLIN SODIUM/WATER 2 G/20 ML
2 SYRINGE (ML) INTRAVENOUS ONCE
Status: COMPLETED | OUTPATIENT
Start: 2021-04-27 | End: 2021-04-27

## 2021-04-27 RX ORDER — HYDROCODONE BITARTRATE AND ACETAMINOPHEN 5; 325 MG/1; MG/1
2 TABLET ORAL AS NEEDED
Status: DISCONTINUED | OUTPATIENT
Start: 2021-04-27 | End: 2021-04-27

## 2021-04-27 RX ORDER — LIDOCAINE HYDROCHLORIDE 10 MG/ML
INJECTION, SOLUTION INFILTRATION; PERINEURAL AS NEEDED
Status: DISCONTINUED | OUTPATIENT
Start: 2021-04-27 | End: 2021-04-27 | Stop reason: HOSPADM

## 2021-04-27 RX ORDER — HYDROCODONE BITARTRATE AND ACETAMINOPHEN 5; 325 MG/1; MG/1
1 TABLET ORAL EVERY 4 HOURS PRN
Qty: 30 TABLET | Refills: 0 | Status: SHIPPED | OUTPATIENT
Start: 2021-04-27 | End: 2021-08-12

## 2021-04-27 RX ORDER — MORPHINE SULFATE 2 MG/ML
INJECTION, SOLUTION INTRAMUSCULAR; INTRAVENOUS AS NEEDED
Status: DISCONTINUED | OUTPATIENT
Start: 2021-04-27 | End: 2021-04-27 | Stop reason: HOSPADM

## 2021-04-27 RX ORDER — HYDROMORPHONE HYDROCHLORIDE 1 MG/ML
0.4 INJECTION, SOLUTION INTRAMUSCULAR; INTRAVENOUS; SUBCUTANEOUS EVERY 5 MIN PRN
Status: DISCONTINUED | OUTPATIENT
Start: 2021-04-27 | End: 2021-04-27

## 2021-04-27 RX ADMIN — SODIUM CHLORIDE, SODIUM LACTATE, POTASSIUM CHLORIDE, CALCIUM CHLORIDE: 600; 310; 30; 20 INJECTION, SOLUTION INTRAVENOUS at 08:38:00

## 2021-04-27 RX ADMIN — CEFAZOLIN SODIUM/WATER 2 G: 2 G/20 ML SYRINGE (ML) INTRAVENOUS at 07:10:00

## 2021-04-27 RX ADMIN — DEXAMETHASONE SODIUM PHOSPHATE 4 MG: 4 MG/ML VIAL (ML) INJECTION at 07:11:00

## 2021-04-27 RX ADMIN — ONDANSETRON 4 MG: 2 INJECTION INTRAMUSCULAR; INTRAVENOUS at 07:11:00

## 2021-04-27 RX ADMIN — SODIUM CHLORIDE, SODIUM LACTATE, POTASSIUM CHLORIDE, CALCIUM CHLORIDE: 600; 310; 30; 20 INJECTION, SOLUTION INTRAVENOUS at 07:05:00

## 2021-04-27 RX ADMIN — LIDOCAINE HYDROCHLORIDE 25 MG: 10 INJECTION, SOLUTION EPIDURAL; INFILTRATION; INTRACAUDAL; PERINEURAL at 07:06:00

## 2021-04-27 RX ADMIN — KETOROLAC TROMETHAMINE 30 MG: 30 INJECTION, SOLUTION INTRAMUSCULAR; INTRAVENOUS at 08:25:00

## 2021-04-27 NOTE — OPERATIVE REPORT
[facility]    PATIENT'S NAME: ALEXIS Meredith   ATTENDING PHYSICIAN: Jarad Darling M.D. OPERATING PHYSICIAN: Jarad Darling M.D.    PATIENT ACCOUNT#:   [de-identified]    LOCATION:  PACU [facility] PACU 3 EDWP 10  MEDICAL RECORD #:   [id number]       DATE OF BIRTH: felt to be too high to proceed. He was medically optimized after the initial cancellation of the surgery and cleared after blood sugars were considered to be in an acceptable range. COVID testing was also negative.   We therefore proceeded following the i the posterior horn of the medial meniscus which was unstable. A partial medial meniscectomy was therefore carried out resecting the unstable flap to a stable margin.   This required resection of 50% of the posterior horn extending to the junction of the mi the patellofemoral articulation where chondroplasty was carried out primarily at the depth of the trochlea where there were irregular flaps and fibrillated margins of articular cartilage, which was grade 3 in severity.   It did involve the majority of the w

## 2021-04-27 NOTE — H&P
Orthopaedic H&P Update    H&P performed by Dr Ana María Toscano on 4/5/21 was reviewed by Gail Srivastava MD on 4/27/2021, the patient was examined and no significant changes have occurred in the patient's condition since the H&P was performed.   Risks and benefits were d

## 2021-04-27 NOTE — ANESTHESIA PREPROCEDURE EVALUATION
PRE-OP EVALUATION    Patient Name: Saundra Mcclain    Admit Diagnosis: Tear of medial meniscus of right knee, current, unspecified tear type, initial encounter [L03.312A]  Chondromalacia, knee, right [M94.450]    Pre-op Diagnosis: Tear of medial meniscus GI/Hepatic/Renal    Negative GI/hepatic/renal ROS.                              Cardiovascular            MET: >4    (+) obesity                                       Endo/Other      (+) diabetes and well controlled, type 2, not using insulin

## 2021-04-27 NOTE — ANESTHESIA PROCEDURE NOTES
Airway  Date/Time: 4/27/2021 7:08 AM  Urgency: elective    Airway not difficult    General Information and Staff    Patient location during procedure: OR  Anesthesiologist: Vandana Marques MD  Performed: anesthesiologist     Indications and Patient Cond

## 2021-04-27 NOTE — ANESTHESIA POSTPROCEDURE EVALUATION
[id number] [address] Adal[address] Patient Status:  Hospital Outpatient Surgery   Age/Gender 50year old male MRN [id number]   [facility] SURGERY Attending Cindy Roche MD   Hosp Day # 0 PCP Tip Holland MD       Anesthesia Post-op Note

## 2021-04-27 NOTE — BRIEF OP NOTE
Pre-Operative Diagnosis: Tear of medial meniscus of right knee, current, unspecified tear type, initial encounter [S83.241A]  Chondromalacia, knee, right [M94.261]     Post-Operative Diagnosis: Tear of medial meniscus of right knee, current, unspecified te

## 2021-04-28 ENCOUNTER — TELEPHONE (OUTPATIENT)
Dept: ORTHOPEDICS CLINIC | Facility: CLINIC | Age: 56
End: 2021-04-28

## 2021-04-28 NOTE — TELEPHONE ENCOUNTER
Called been to follow up after surgery to see how he is doing and he stated that he is doing well. I did express per Dr. Shannan Encarnacion that he is to be non weight bearing and this will be for about 6 weeks.

## 2021-05-06 ENCOUNTER — OFFICE VISIT (OUTPATIENT)
Dept: ORTHOPEDICS CLINIC | Facility: CLINIC | Age: 56
End: 2021-05-06
Payer: OTHER MISCELLANEOUS

## 2021-05-06 VITALS — WEIGHT: 235 LBS | HEIGHT: 72 IN | BODY MASS INDEX: 31.83 KG/M2

## 2021-05-06 DIAGNOSIS — M23.8X1 CHONDRAL DEFECT OF CONDYLE OF RIGHT FEMUR: ICD-10-CM

## 2021-05-06 DIAGNOSIS — Z48.89 AFTERCARE FOLLOWING SURGERY: ICD-10-CM

## 2021-05-06 DIAGNOSIS — Z98.890 S/P RIGHT KNEE ARTHROSCOPY: Primary | ICD-10-CM

## 2021-05-06 PROCEDURE — 3008F BODY MASS INDEX DOCD: CPT | Performed by: ORTHOPAEDIC SURGERY

## 2021-05-06 PROCEDURE — 99024 POSTOP FOLLOW-UP VISIT: CPT | Performed by: ORTHOPAEDIC SURGERY

## 2021-05-06 NOTE — PROGRESS NOTES
[facility] Post-op Progress Note      Date of Surgery: 4/27/2021      History: The patient is a 50year old male presenting for postoperative follow-up approximately 10 days status post arthroscopy of the right knee.   The patient reports no imm working or any driving. And expressed gratitude and understanding. He was advised to follow-up sooner if there are any new symptoms or concerns.     Ameena Gonzalez MD  [facility]    The dictation was partially prepared using WILLIAM VO [facility] v

## 2021-05-08 DIAGNOSIS — M25.562 CHRONIC PAIN OF LEFT KNEE: ICD-10-CM

## 2021-05-08 DIAGNOSIS — G89.29 CHRONIC PAIN OF LEFT KNEE: ICD-10-CM

## 2021-05-10 RX ORDER — DICLOFENAC SODIUM 75 MG/1
75 TABLET, DELAYED RELEASE ORAL 2 TIMES DAILY PRN
Qty: 60 TABLET | Refills: 0 | Status: SHIPPED | OUTPATIENT
Start: 2021-05-10 | End: 2021-06-07

## 2021-05-14 ENCOUNTER — TELEPHONE (OUTPATIENT)
Dept: PHYSICAL THERAPY | Age: 56
End: 2021-05-14

## 2021-05-14 ENCOUNTER — TELEPHONE (OUTPATIENT)
Dept: ORTHOPEDICS CLINIC | Facility: CLINIC | Age: 56
End: 2021-05-14

## 2021-05-14 NOTE — TELEPHONE ENCOUNTER
Request received for 12/7/2020, 1/25/21, 2/1/21, and 5/6/21.     Faxed notes to workers comp  at [phone]. Received fax confirmation.

## 2021-05-17 ENCOUNTER — OFFICE VISIT (OUTPATIENT)
Dept: PHYSICAL THERAPY | Age: 56
End: 2021-05-17
Attending: ORTHOPAEDIC SURGERY
Payer: OTHER MISCELLANEOUS

## 2021-05-17 ENCOUNTER — TELEPHONE (OUTPATIENT)
Dept: ORTHOPEDICS CLINIC | Facility: CLINIC | Age: 56
End: 2021-05-17

## 2021-05-17 ENCOUNTER — PATIENT MESSAGE (OUTPATIENT)
Dept: PHYSICAL THERAPY | Age: 56
End: 2021-05-17

## 2021-05-17 DIAGNOSIS — Z98.890 S/P RIGHT KNEE ARTHROSCOPY: ICD-10-CM

## 2021-05-17 PROCEDURE — 97110 THERAPEUTIC EXERCISES: CPT | Performed by: PHYSICAL THERAPIST

## 2021-05-17 PROCEDURE — 97116 GAIT TRAINING THERAPY: CPT | Performed by: PHYSICAL THERAPIST

## 2021-05-17 PROCEDURE — 97161 PT EVAL LOW COMPLEX 20 MIN: CPT | Performed by: PHYSICAL THERAPIST

## 2021-05-17 NOTE — TELEPHONE ENCOUNTER
Patient is requesting a new order for physical therapy for a new location. States he has called to schedule his therapy multiple times and he can never get a hold of anyone.

## 2021-05-19 ENCOUNTER — APPOINTMENT (OUTPATIENT)
Dept: PHYSICAL THERAPY | Age: 56
End: 2021-05-19
Attending: ORTHOPAEDIC SURGERY
Payer: COMMERCIAL

## 2021-05-19 ENCOUNTER — OFFICE VISIT (OUTPATIENT)
Dept: PHYSICAL THERAPY | Age: 56
End: 2021-05-19
Attending: ORTHOPAEDIC SURGERY
Payer: OTHER MISCELLANEOUS

## 2021-05-19 PROCEDURE — 97140 MANUAL THERAPY 1/> REGIONS: CPT | Performed by: PHYSICAL THERAPIST

## 2021-05-19 PROCEDURE — 97110 THERAPEUTIC EXERCISES: CPT | Performed by: PHYSICAL THERAPIST

## 2021-05-19 NOTE — PROGRESS NOTES
Dx: partial medial menisectomy with lateral femoral condyle microfracture on 4/27/2021          Authorized # of Visits:  12         Next MD visit: none scheduled  Fall Risk: standard         Precautions: TDWB R LE X 6 wks            Subjective: Pt states h Charges:  There ex X 2, man there X 1       Total Timed Treatment: 45 min  Total Treatment Time: 45 min

## 2021-05-24 ENCOUNTER — OFFICE VISIT (OUTPATIENT)
Dept: PHYSICAL THERAPY | Age: 56
End: 2021-05-24
Attending: ORTHOPAEDIC SURGERY
Payer: OTHER MISCELLANEOUS

## 2021-05-24 PROCEDURE — 97140 MANUAL THERAPY 1/> REGIONS: CPT | Performed by: PHYSICAL THERAPIST

## 2021-05-24 PROCEDURE — 97110 THERAPEUTIC EXERCISES: CPT | Performed by: PHYSICAL THERAPIST

## 2021-05-25 NOTE — PROGRESS NOTES
Dx: partial medial menisectomy with lateral femoral condyle microfracture on 4/27/2021          Authorized # of Visits:  12         Next MD visit: none scheduled  Fall Risk: standard         Precautions: TDWB R LE X 6 wks            Subjective: Pt states h X 10 Heel slides on board 2 X 10        Supine clams blue TB X 20 SAQ X 10        Ankle pumps in supine X 20 Bridging on bolster X 20         Ankle pumps X 20         MAN THERE 10 MINS        MAN THERE 15 MINS STM to R knee         STM to R knee

## 2021-05-26 ENCOUNTER — OFFICE VISIT (OUTPATIENT)
Dept: PHYSICAL THERAPY | Age: 56
End: 2021-05-26
Attending: ORTHOPAEDIC SURGERY
Payer: OTHER MISCELLANEOUS

## 2021-05-26 PROCEDURE — 97140 MANUAL THERAPY 1/> REGIONS: CPT | Performed by: PHYSICAL THERAPIST

## 2021-05-26 PROCEDURE — 97110 THERAPEUTIC EXERCISES: CPT | Performed by: PHYSICAL THERAPIST

## 2021-05-26 NOTE — PROGRESS NOTES
Dx: partial medial menisectomy with lateral femoral condyle microfracture on 4/27/2021          Authorized # of Visits:  12         Next MD visit: June 3rd 2021  Fall Risk: standard         Precautions: TDWB R LE X 6 wks            Subjective: Pt reports R Supine clams blue TB X 20 SAQ X 10 Side lying hip add 2 X 10 R/L       Ankle pumps in supine X 20 Bridging on bolster X 20 Prone hip ext 2 X 10 R/L        Ankle pumps X 20 Heel slides X 20        MAN THERE 10 MINS Bridging on bolster X 20       MAN THE

## 2021-06-01 ENCOUNTER — TELEPHONE (OUTPATIENT)
Dept: PHYSICAL THERAPY | Facility: HOSPITAL | Age: 56
End: 2021-06-01

## 2021-06-01 ENCOUNTER — TELEPHONE (OUTPATIENT)
Dept: PHYSICAL THERAPY | Age: 56
End: 2021-06-01

## 2021-06-01 ENCOUNTER — OFFICE VISIT (OUTPATIENT)
Dept: PHYSICAL THERAPY | Age: 56
End: 2021-06-01
Attending: ORTHOPAEDIC SURGERY
Payer: OTHER MISCELLANEOUS

## 2021-06-01 PROCEDURE — 97140 MANUAL THERAPY 1/> REGIONS: CPT | Performed by: PHYSICAL THERAPIST

## 2021-06-01 PROCEDURE — 97110 THERAPEUTIC EXERCISES: CPT | Performed by: PHYSICAL THERAPIST

## 2021-06-01 NOTE — PROGRESS NOTES
Dx: partial medial menisectomy with lateral femoral condyle microfracture on 4/27/2021          Authorized # of Visits:  12         Next MD visit: June 3rd 2021  Fall Risk: standard         Precautions: TDWB R LE X 6 wks           Progress Summary  Pt has grossly 4+/5 to be able to get up and down from the floor safely  · Pt will demonstrate increased hip ER/ABD strength to 4+/5 to perform stepping and squatting activities without excessive femoral IR/ADD  · Pt will improve SLS to >15s to improve safety and X 10 Heel slides on board 2 X 10 Side lying hip abd 2 X 10 R/L Side lying hip abd 2 X 10 R/L      Supine clams blue TB X 20 SAQ X 10 Side lying hip add 2 X 10 R/L Side lying hip add 2 X 10 R/L      Ankle pumps in supine X 20 Bridging on bolster X 20 Prone

## 2021-06-04 ENCOUNTER — TELEPHONE (OUTPATIENT)
Dept: ORTHOPEDICS CLINIC | Facility: CLINIC | Age: 56
End: 2021-06-04

## 2021-06-04 NOTE — TELEPHONE ENCOUNTER
Patient is requesting a sooner appointment with Dr. Hetal Riley for a post op visit. Soonest available not until 6/21/2021. Patient stated he was in on 6/03/2021 but left without being seen after a long wait.  Patient stated he would be okay with either in office

## 2021-06-04 NOTE — TELEPHONE ENCOUNTER
Called and spoke with [facility] and set him up to see Dr Manny Mullen on 6/7/21 at 8am.

## 2021-06-05 DIAGNOSIS — G89.29 CHRONIC PAIN OF LEFT KNEE: ICD-10-CM

## 2021-06-05 DIAGNOSIS — M25.562 CHRONIC PAIN OF LEFT KNEE: ICD-10-CM

## 2021-06-07 ENCOUNTER — OFFICE VISIT (OUTPATIENT)
Dept: PHYSICAL THERAPY | Age: 56
End: 2021-06-07
Attending: ORTHOPAEDIC SURGERY
Payer: OTHER MISCELLANEOUS

## 2021-06-07 ENCOUNTER — OFFICE VISIT (OUTPATIENT)
Dept: ORTHOPEDICS CLINIC | Facility: CLINIC | Age: 56
End: 2021-06-07
Payer: OTHER MISCELLANEOUS

## 2021-06-07 VITALS — BODY MASS INDEX: 32.51 KG/M2 | HEIGHT: 72 IN | WEIGHT: 240 LBS

## 2021-06-07 DIAGNOSIS — Z48.89 AFTERCARE FOLLOWING SURGERY: Primary | ICD-10-CM

## 2021-06-07 PROCEDURE — 3008F BODY MASS INDEX DOCD: CPT | Performed by: ORTHOPAEDIC SURGERY

## 2021-06-07 PROCEDURE — 99024 POSTOP FOLLOW-UP VISIT: CPT | Performed by: ORTHOPAEDIC SURGERY

## 2021-06-07 PROCEDURE — 97110 THERAPEUTIC EXERCISES: CPT | Performed by: PHYSICAL THERAPIST

## 2021-06-07 RX ORDER — DICLOFENAC SODIUM 75 MG/1
75 TABLET, DELAYED RELEASE ORAL 2 TIMES DAILY PRN
Qty: 60 TABLET | Refills: 0 | Status: SHIPPED | OUTPATIENT
Start: 2021-06-07

## 2021-06-07 NOTE — PROGRESS NOTES
Dx: partial medial menisectomy with lateral femoral condyle microfracture on 4/27/2021          Authorized # of Visits:  12         Next MD visit: June 3rd 2021  Fall Risk: standard         Precautions: TDWB R LE X 6 wks           Progress Summary  Pt has independence with gait on uneven surfaces such as grass  · Pt will be independent and compliant with comprehensive HEP to maintain progress achieved in PT        Plan: If sx allow, increase resistance for bed exercises         Date: 5/19/2021  TX#: 2/12 Rajan

## 2021-06-07 NOTE — PROGRESS NOTES
[facility] Post-op Progress Note      Date of Surgery: 4/7/2021      History: Mr. Doris Saxena is a 28-year-old male presenting approximately 5 weeks postop after undergoing right knee arthroscopy.   He tolerates physical therapy and has been protected

## 2021-06-07 NOTE — TELEPHONE ENCOUNTER
DICLOFENAC SODIUM 75 MG Oral Tab EC 60 tablet 0 5/10/2021    Sig:   TAKE 1 TABLET (75 MG TOTAL) BY MOUTH 2 (TWO) TIMES DAILY AS        LOV 4/15/21    No future OV.      Patient following up with Dr. Syed Hope  Has post-op appt today

## 2021-06-09 ENCOUNTER — APPOINTMENT (OUTPATIENT)
Dept: PHYSICAL THERAPY | Age: 56
End: 2021-06-09
Attending: ORTHOPAEDIC SURGERY
Payer: OTHER MISCELLANEOUS

## 2021-06-22 ENCOUNTER — OFFICE VISIT (OUTPATIENT)
Dept: PHYSICAL THERAPY | Age: 56
End: 2021-06-22
Attending: ORTHOPAEDIC SURGERY
Payer: OTHER MISCELLANEOUS

## 2021-06-22 ENCOUNTER — TELEPHONE (OUTPATIENT)
Dept: PHYSICAL THERAPY | Age: 56
End: 2021-06-22

## 2021-06-22 PROCEDURE — 97110 THERAPEUTIC EXERCISES: CPT | Performed by: PHYSICAL THERAPIST

## 2021-06-23 ENCOUNTER — APPOINTMENT (OUTPATIENT)
Dept: PHYSICAL THERAPY | Age: 56
End: 2021-06-23
Attending: ORTHOPAEDIC SURGERY
Payer: OTHER MISCELLANEOUS

## 2021-06-24 ENCOUNTER — OFFICE VISIT (OUTPATIENT)
Dept: PHYSICAL THERAPY | Age: 56
End: 2021-06-24
Attending: ORTHOPAEDIC SURGERY
Payer: COMMERCIAL

## 2021-06-24 PROCEDURE — 97110 THERAPEUTIC EXERCISES: CPT | Performed by: PHYSICAL THERAPIST

## 2021-06-24 PROCEDURE — 97140 MANUAL THERAPY 1/> REGIONS: CPT | Performed by: PHYSICAL THERAPIST

## 2021-06-24 NOTE — PROGRESS NOTES
Dx: partial medial menisectomy with lateral femoral condyle microfracture on 4/27/2021          Authorized # of Visits:  12         Next MD visit: June 3rd 2021  Fall Risk: standard         Precautions: TDWB R LE X 6 wks            Subjective: Pt states he crutch on L Slant board stretch 30 sec X 3       Short arc quad with bolster 2 x 10 R March with UE support X 20 Step up 4\" fwd, lat 2 X 10       Side lying hip abd 3 x 10 R/L  Heel raises with UE support X 20 Heel raises X 20       Side lying hip add 3 x

## 2021-06-28 ENCOUNTER — OFFICE VISIT (OUTPATIENT)
Dept: PHYSICAL THERAPY | Age: 56
End: 2021-06-28
Attending: ORTHOPAEDIC SURGERY
Payer: OTHER MISCELLANEOUS

## 2021-06-28 ENCOUNTER — PATIENT OUTREACH (OUTPATIENT)
Dept: FAMILY MEDICINE CLINIC | Facility: CLINIC | Age: 56
End: 2021-06-28

## 2021-06-28 PROCEDURE — 97110 THERAPEUTIC EXERCISES: CPT | Performed by: PHYSICAL THERAPIST

## 2021-06-28 NOTE — PROGRESS NOTES
Dx: partial medial menisectomy with lateral femoral condyle microfracture on 4/27/2021          Authorized # of Visits:  12         Next MD visit: June 3rd 2021  Fall Risk: standard         Precautions: WBAT          Subjective: Pt states he is now using o March with UE support X 20 Step up 4\" fwd 2 X 10        Side lying hip abd 3 x 10 R/L  Heel raises with UE support X 20 Step up lat 4\" 2 X 10        Side lying hip add 3 x 10 R/L  Toe raises with UE support X 20 Step down dip 4\" 2 X 10 with UE support

## 2021-06-30 ENCOUNTER — OFFICE VISIT (OUTPATIENT)
Dept: PHYSICAL THERAPY | Age: 56
End: 2021-06-30
Attending: ORTHOPAEDIC SURGERY
Payer: OTHER MISCELLANEOUS

## 2021-06-30 PROCEDURE — 97110 THERAPEUTIC EXERCISES: CPT | Performed by: PHYSICAL THERAPIST

## 2021-07-01 NOTE — PROGRESS NOTES
Dx: partial medial menisectomy with lateral femoral condyle microfracture on 4/27/2021          Authorized # of Visits:  12         Next MD visit: June 3rd 2021  Fall Risk: standard         Precautions: WBAT          Subjective: No significant complaints o R   Walking with one crutch on L Slant board stretch 30 sec X 3 Slant board stretch 30 sec X 3       Short arc quad with bolster 2 x 10 R March with UE support X 20 Step up 4\" fwd 2 X 10 BOSU FWd lunge X 20        Side lying hip abd 3 x 10 R/L  Heel raise

## 2021-07-06 ENCOUNTER — OFFICE VISIT (OUTPATIENT)
Dept: PHYSICAL THERAPY | Age: 56
End: 2021-07-06
Attending: ORTHOPAEDIC SURGERY
Payer: OTHER MISCELLANEOUS

## 2021-07-06 PROCEDURE — 97110 THERAPEUTIC EXERCISES: CPT | Performed by: PHYSICAL THERAPIST

## 2021-07-06 NOTE — PROGRESS NOTES
Dx: partial medial menisectomy with lateral femoral condyle microfracture on 4/27/2021          Authorized # of Visits:  12         Next MD visit: July 8th 2021  Fall Risk: standard         Precautions: WBAT         Progress Summary  Pt has attended 10 vis to [facility].     Goals:   (To be met in 12 visits)   · Pt will improve knee extension ROM to 0 deg to allow proper heel strike during gait and terminal knee extension in stance MET (7/6/2021)  · Pt will improve knee AROM flexion to >125 degre care.    X___________________________________________________ Date____________________    Certification From: 4/2/1848  To:10/4/2021            Date: 6/7/2021  TX#: 6/12 Date: 6/22/2021  TX#: 7/12 Date: 6/28/2021  TX#: 8/12 Date: 6/30/2021  TX#: 9/12 Date: R/L          Long arc quads X 20                         Charges:  There ex X 4     Total Timed Treatment: 55 min  Total Treatment Time: 55 min

## 2021-07-08 ENCOUNTER — OFFICE VISIT (OUTPATIENT)
Dept: ORTHOPEDICS CLINIC | Facility: CLINIC | Age: 56
End: 2021-07-08
Payer: OTHER MISCELLANEOUS

## 2021-07-08 ENCOUNTER — APPOINTMENT (OUTPATIENT)
Dept: PHYSICAL THERAPY | Age: 56
End: 2021-07-08
Attending: ORTHOPAEDIC SURGERY
Payer: OTHER MISCELLANEOUS

## 2021-07-08 DIAGNOSIS — Z48.89 AFTERCARE FOLLOWING SURGERY: Primary | ICD-10-CM

## 2021-07-08 PROCEDURE — 99024 POSTOP FOLLOW-UP VISIT: CPT | Performed by: ORTHOPAEDIC SURGERY

## 2021-07-08 NOTE — PROGRESS NOTES
[facility] Post-op Progress Note      Date of Surgery: 4/27/21      History: Patient is a 50year old status post right knee arthroscopy with microfracture who presents for postop follow-up. He is approximately 2-1/2 months from surgery.   He h

## 2021-07-12 ENCOUNTER — APPOINTMENT (OUTPATIENT)
Dept: PHYSICAL THERAPY | Age: 56
End: 2021-07-12
Attending: ORTHOPAEDIC SURGERY
Payer: OTHER MISCELLANEOUS

## 2021-07-14 ENCOUNTER — TELEPHONE (OUTPATIENT)
Dept: PHYSICAL THERAPY | Age: 56
End: 2021-07-14

## 2021-07-14 ENCOUNTER — APPOINTMENT (OUTPATIENT)
Dept: PHYSICAL THERAPY | Age: 56
End: 2021-07-14
Attending: ORTHOPAEDIC SURGERY
Payer: OTHER MISCELLANEOUS

## 2021-07-16 ENCOUNTER — OFFICE VISIT (OUTPATIENT)
Dept: PHYSICAL THERAPY | Age: 56
End: 2021-07-16
Attending: ORTHOPAEDIC SURGERY
Payer: COMMERCIAL

## 2021-07-16 PROCEDURE — 97110 THERAPEUTIC EXERCISES: CPT

## 2021-07-16 NOTE — PROGRESS NOTES
Dx: partial medial menisectomy with lateral femoral condyle microfracture on 4/27/2021          Authorized # of Visits:  12         Next MD visit: July 8th 2021  Fall Risk: standard         Precautions: WBAT          Subjective: pt notes the front of his k Date: 7/6/2021  TX#: 10/12 Date:  7/16/21  TX#: 11/12 Date:   TX#: /12 Date:   TX#: /12   THERE EX  45 MINS THERE EX  45 MINS THERE EX  45 MINS THERE EX  45 MINS THERE EX  55 MINS Ther ex: 40 min     Recumbent bike X 5 mins Recumbent bike X 5 mins level 4 board stretch 30 sec X 3      Prone hip ext 2 X 10 R/L   Step down dip 4\" X 5       Side lying clam 2 X 10 R/L    Manual therapy: 8 min  STM R quad, PF med glide grade III      Long arc quads X 20                         Charges:  There ex X 3     Total Ti

## 2021-07-19 ENCOUNTER — OFFICE VISIT (OUTPATIENT)
Dept: PHYSICAL THERAPY | Age: 56
End: 2021-07-19
Attending: ORTHOPAEDIC SURGERY
Payer: OTHER MISCELLANEOUS

## 2021-07-19 PROCEDURE — 97110 THERAPEUTIC EXERCISES: CPT

## 2021-07-19 PROCEDURE — 97140 MANUAL THERAPY 1/> REGIONS: CPT

## 2021-07-19 NOTE — PROGRESS NOTES
Dx: partial medial menisectomy with lateral femoral condyle microfracture on 4/27/2021          Authorized # of Visits:  12         Next MD visit: 8/12/2021  Fall Risk: standard         Precautions: WBAT          Subjective: Pt reports continued R JOAO [place] Button Recumbent bike X 5 mins Recumbent bike X 5 mins level 4 Recumbent bike X 5 mins level 4 Recumbent bike X 5 mins level 4 Recumbent bike X 5 mins level 4 Recumbent bike X 5 mins level 4 Recumbent bike X 5 mins level 4    Long arc quad  short sitting x 20 R Side lying hip add 2 X 10 R/L Toe raises X 20 Shuttle unilat leg press 31# X 20 Reassessment Slant board stretch 30 sec X 3      Prone hip ext 2 X 10 R/L   Step down dip 4\" X 5       Side lying clam 2 X 10 R/L    Manual therapy: 8 min  ELSY R quad, PF med

## 2021-07-21 ENCOUNTER — OFFICE VISIT (OUTPATIENT)
Dept: PHYSICAL THERAPY | Age: 56
End: 2021-07-21
Attending: ORTHOPAEDIC SURGERY
Payer: OTHER MISCELLANEOUS

## 2021-07-21 PROCEDURE — 97110 THERAPEUTIC EXERCISES: CPT | Performed by: PHYSICAL THERAPIST

## 2021-07-26 ENCOUNTER — OFFICE VISIT (OUTPATIENT)
Dept: PHYSICAL THERAPY | Age: 56
End: 2021-07-26
Attending: ORTHOPAEDIC SURGERY
Payer: OTHER MISCELLANEOUS

## 2021-07-26 PROCEDURE — 97140 MANUAL THERAPY 1/> REGIONS: CPT | Performed by: PHYSICAL THERAPIST

## 2021-07-26 PROCEDURE — 97110 THERAPEUTIC EXERCISES: CPT | Performed by: PHYSICAL THERAPIST
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
## 2021-07-28 ENCOUNTER — OFFICE VISIT (OUTPATIENT)
Dept: PHYSICAL THERAPY | Age: 56
End: 2021-07-28
Attending: ORTHOPAEDIC SURGERY
Payer: OTHER MISCELLANEOUS

## 2021-07-28 PROCEDURE — 97110 THERAPEUTIC EXERCISES: CPT | Performed by: PHYSICAL THERAPIST

## 2021-08-02 ENCOUNTER — TELEPHONE (OUTPATIENT)
Dept: PHYSICAL THERAPY | Facility: HOSPITAL | Age: 56
End: 2021-08-02

## 2021-08-04 ENCOUNTER — OFFICE VISIT (OUTPATIENT)
Dept: PHYSICAL THERAPY | Age: 56
End: 2021-08-04
Attending: ORTHOPAEDIC SURGERY
Payer: COMMERCIAL

## 2021-08-04 PROCEDURE — 97110 THERAPEUTIC EXERCISES: CPT | Performed by: PHYSICAL THERAPIST

## 2021-08-04 NOTE — PROGRESS NOTES
Dx: partial medial menisectomy with lateral femoral condyle microfracture on 4/27/2021          Authorized # of Visits:  12         Next MD visit: 8/12/2021  Fall Risk: standard         Precautions: WBAT         Discharge Summary  Pt has attended 16 visits quad strength to 5/5 to ascend 1 flight of stairs reciprocally without UE assist MET (8/5/2021)  · Pt will increase hip and knee strength to grossly 4+/5 to be able to get up and down from the floor safely MET (8/5/2021)  · Pt will demonstrate increased hi mins Upright bike level 7 X 5 mins    Ruben stretch 30\"x3 R Slant board stretch 30 sec X 3 Slant board stretch 30 sec X 3 Slant board stretch 30 sec X 3 Slant board stretch 30 sec X 3 Slant board stretch 30 sec X 3    Step down dip 4\" X 10 Step down dip ex X 4  Total Timed Treatment: 55 min  Total Treatment Time: 55 min

## 2021-08-05 ENCOUNTER — TELEPHONE (OUTPATIENT)
Dept: PHYSICAL THERAPY | Facility: HOSPITAL | Age: 56
End: 2021-08-05

## 2021-08-11 ENCOUNTER — APPOINTMENT (OUTPATIENT)
Dept: PHYSICAL THERAPY | Age: 56
End: 2021-08-11
Attending: ORTHOPAEDIC SURGERY
Payer: COMMERCIAL

## 2021-08-12 ENCOUNTER — OFFICE VISIT (OUTPATIENT)
Dept: ORTHOPEDICS CLINIC | Facility: CLINIC | Age: 56
End: 2021-08-12
Payer: OTHER MISCELLANEOUS

## 2021-08-12 DIAGNOSIS — R29.898 TRANSIENT RIGHT LEG WEAKNESS: ICD-10-CM

## 2021-08-12 DIAGNOSIS — Z09 FOLLOW-UP EXAMINATION, FOLLOWING OTHER SURGERY: Primary | ICD-10-CM

## 2021-08-12 PROCEDURE — 99213 OFFICE O/P EST LOW 20 MIN: CPT | Performed by: ORTHOPAEDIC SURGERY

## 2021-08-12 NOTE — PROGRESS NOTES
[facility] Progress Note      Date of Surgery: 4/27/2021      History: Mr. Delia Parsons is a 57-year-old male approximately 3-1/2 months status post arthroscopy with partial medial meniscectomy and microfracture lateral femoral condyle.   He is concerns. We be happy to see him in a separate appointment for his left knee if needed. Kati Poe MD  [address] [place] [address] Surgery    The dictation was partially prepared using [employer] voice recognition software.  Errors may occur, which have be

## 2021-08-16 ENCOUNTER — APPOINTMENT (OUTPATIENT)
Dept: PHYSICAL THERAPY | Age: 56
End: 2021-08-16
Attending: ORTHOPAEDIC SURGERY
Payer: COMMERCIAL

## 2021-08-19 ENCOUNTER — APPOINTMENT (OUTPATIENT)
Dept: PHYSICAL THERAPY | Age: 56
End: 2021-08-19
Attending: ORTHOPAEDIC SURGERY
Payer: COMMERCIAL

## 2021-08-20 ENCOUNTER — TELEPHONE (OUTPATIENT)
Dept: ORTHOPEDICS CLINIC | Facility: CLINIC | Age: 56
End: 2021-08-20

## 2021-08-20 NOTE — TELEPHONE ENCOUNTER
Request received for 7/8/21.     Faxed notes to workers comp  at [phone]. Received fax confirmation.

## 2021-08-23 ENCOUNTER — APPOINTMENT (OUTPATIENT)
Dept: PHYSICAL THERAPY | Age: 56
End: 2021-08-23
Attending: ORTHOPAEDIC SURGERY
Payer: COMMERCIAL

## 2021-08-26 ENCOUNTER — APPOINTMENT (OUTPATIENT)
Dept: PHYSICAL THERAPY | Age: 56
End: 2021-08-26
Attending: ORTHOPAEDIC SURGERY
Payer: COMMERCIAL

## 2021-08-31 ENCOUNTER — TELEPHONE (OUTPATIENT)
Dept: PHYSICAL THERAPY | Facility: HOSPITAL | Age: 56
End: 2021-08-31

## 2021-09-07 ENCOUNTER — PATIENT OUTREACH (OUTPATIENT)
Dept: FAMILY MEDICINE CLINIC | Facility: CLINIC | Age: 56
End: 2021-09-07

## 2021-09-09 ENCOUNTER — OFFICE VISIT (OUTPATIENT)
Dept: ORTHOPEDICS CLINIC | Facility: CLINIC | Age: 56
End: 2021-09-09
Payer: COMMERCIAL

## 2021-09-09 VITALS — BODY MASS INDEX: 34 KG/M2 | WEIGHT: 251 LBS | HEIGHT: 72 IN

## 2021-09-09 DIAGNOSIS — M23.204 OLD TEAR OF MEDIAL MENISCUS OF LEFT KNEE, UNSPECIFIED TEAR TYPE: Primary | ICD-10-CM

## 2021-09-09 DIAGNOSIS — M22.42 CHONDROMALACIA OF LEFT PATELLA: ICD-10-CM

## 2021-09-09 PROCEDURE — 99214 OFFICE O/P EST MOD 30 MIN: CPT | Performed by: ORTHOPAEDIC SURGERY

## 2021-09-09 PROCEDURE — 3008F BODY MASS INDEX DOCD: CPT | Performed by: ORTHOPAEDIC SURGERY

## 2021-09-09 NOTE — PROGRESS NOTES
[facility] Note    CC: Patient presents with: Follow - Up: Patient is here for a follow up of his left knee. He states that it has been giving him problems he is feeling some clicking in the knee.        HPI: The patient is a 49year old male w Diabetes Maternal Grandmother    • Heart Attack Maternal Grandmother    • Stroke Maternal Grandmother    • Diabetes Paternal Grandfather      Social History    Occupational History      Not on file    Tobacco Use      Smoking status: Never Smoker      Smok including continued conservative care versus arthroscopic surgical treatment. He is obviously familiar with this treatment option having recovered from the right knee arthroscopy earlier this year.   At this point he is not amenable to proceeding with surg

## 2021-09-20 ENCOUNTER — OFFICE VISIT (OUTPATIENT)
Dept: ORTHOPEDICS CLINIC | Facility: CLINIC | Age: 56
End: 2021-09-20
Payer: OTHER MISCELLANEOUS

## 2021-09-20 VITALS — WEIGHT: 252 LBS | HEIGHT: 72 IN | BODY MASS INDEX: 34.13 KG/M2

## 2021-09-20 DIAGNOSIS — Z09 FOLLOW-UP EXAMINATION, FOLLOWING OTHER SURGERY: Primary | ICD-10-CM

## 2021-09-20 PROCEDURE — 99213 OFFICE O/P EST LOW 20 MIN: CPT | Performed by: ORTHOPAEDIC SURGERY

## 2021-09-20 PROCEDURE — 3008F BODY MASS INDEX DOCD: CPT | Performed by: ORTHOPAEDIC SURGERY

## 2021-09-20 NOTE — PROGRESS NOTES
[facility] Post-op Progress Note      Date of Surgery: 4/27/2021      History: Mr. Remy Barba is a 68-year-old male approximately 5 months status post right knee arthroscopy with microfracture and partial medial meniscectomy.   He is progressing with

## 2021-10-13 ENCOUNTER — TELEPHONE (OUTPATIENT)
Dept: ORTHOPEDICS CLINIC | Facility: CLINIC | Age: 56
End: 2021-10-13

## 2021-10-13 NOTE — TELEPHONE ENCOUNTER
Future Appointments   Date Time Provider Nilton Nichole   10/20/2021  2:40 PM Gabriel Rivera MD [facility] [id number]   11/18/2021  9:00 AM Nimco Britton MD [facility]     · Pt is coming in for LEFT knee discussion for possible surgery  ·

## 2021-10-20 ENCOUNTER — OFFICE VISIT (OUTPATIENT)
Dept: ORTHOPEDICS CLINIC | Facility: CLINIC | Age: 56
End: 2021-10-20
Payer: COMMERCIAL

## 2021-10-20 ENCOUNTER — TELEPHONE (OUTPATIENT)
Dept: ORTHOPEDICS CLINIC | Facility: CLINIC | Age: 56
End: 2021-10-20

## 2021-10-20 VITALS — HEIGHT: 72 IN | BODY MASS INDEX: 33.46 KG/M2 | WEIGHT: 247 LBS

## 2021-10-20 DIAGNOSIS — S83.242A TEAR OF MEDIAL MENISCUS OF LEFT KNEE, CURRENT, UNSPECIFIED TEAR TYPE, INITIAL ENCOUNTER: Primary | ICD-10-CM

## 2021-10-20 DIAGNOSIS — M94.262 CHONDROMALACIA, LEFT KNEE: ICD-10-CM

## 2021-10-20 PROCEDURE — 99214 OFFICE O/P EST MOD 30 MIN: CPT | Performed by: ORTHOPAEDIC SURGERY

## 2021-10-20 PROCEDURE — 3008F BODY MASS INDEX DOCD: CPT | Performed by: ORTHOPAEDIC SURGERY

## 2021-10-20 NOTE — PROGRESS NOTES
[facility] Follow-up Progress Note      Chief Complaint: Left knee pain      History: Mr. Roxanne Posey is a pleasant 72-year-old -American male who returns for evaluation of his chronically painful left knee.   At this point he would like to dis were also reviewed including but not limited to possible cardiac, pulmonary, or cerebrovascular complications, any of which could be life-threatening.   Given the patients stable health history, the risk of the serious complications is felt to be low but no

## 2021-10-20 NOTE — TELEPHONE ENCOUNTER
Surgeon: Dr. Nguyễn Dawson    Date of Surgery: 11/23/2021       Post Op Appt: 12/01/2021    Facility: [facility]    Inpatient or Outpatient: Outpatient    Surgical Assistant: yes    Preadmission Testing Ordered:  yes    Pre-Op Clearance Requested:   PCP: yes

## 2021-11-12 ENCOUNTER — TELEPHONE (OUTPATIENT)
Dept: ORTHOPEDICS CLINIC | Facility: CLINIC | Age: 56
End: 2021-11-12

## 2021-11-12 NOTE — TELEPHONE ENCOUNTER
Request received for 09/20/21 & 10/20/21.     Faxed notes to workers comp  at [phone]. Received fax confirmation.

## 2021-11-18 ENCOUNTER — TELEPHONE (OUTPATIENT)
Dept: FAMILY MEDICINE CLINIC | Facility: CLINIC | Age: 56
End: 2021-11-18

## 2021-11-18 ENCOUNTER — OFFICE VISIT (OUTPATIENT)
Dept: FAMILY MEDICINE CLINIC | Facility: CLINIC | Age: 56
End: 2021-11-18
Payer: COMMERCIAL

## 2021-11-18 VITALS
SYSTOLIC BLOOD PRESSURE: 110 MMHG | TEMPERATURE: 98 F | HEART RATE: 60 BPM | BODY MASS INDEX: 34.58 KG/M2 | OXYGEN SATURATION: 98 % | DIASTOLIC BLOOD PRESSURE: 60 MMHG | HEIGHT: 70.95 IN | WEIGHT: 247 LBS

## 2021-11-18 DIAGNOSIS — G89.29 CHRONIC PAIN OF LEFT KNEE: ICD-10-CM

## 2021-11-18 DIAGNOSIS — E11.9 TYPE 2 DIABETES MELLITUS WITHOUT COMPLICATION, WITHOUT LONG-TERM CURRENT USE OF INSULIN (HCC): ICD-10-CM

## 2021-11-18 DIAGNOSIS — R71.8 LOW MEAN CORPUSCULAR VOLUME (MCV): ICD-10-CM

## 2021-11-18 DIAGNOSIS — M25.562 CHRONIC PAIN OF LEFT KNEE: ICD-10-CM

## 2021-11-18 DIAGNOSIS — Z23 NEED FOR VACCINATION: ICD-10-CM

## 2021-11-18 DIAGNOSIS — Z01.818 PREOPERATIVE CLEARANCE: Primary | ICD-10-CM

## 2021-11-18 PROCEDURE — 83036 HEMOGLOBIN GLYCOSYLATED A1C: CPT | Performed by: FAMILY MEDICINE

## 2021-11-18 PROCEDURE — 90471 IMMUNIZATION ADMIN: CPT | Performed by: FAMILY MEDICINE

## 2021-11-18 PROCEDURE — 3008F BODY MASS INDEX DOCD: CPT | Performed by: FAMILY MEDICINE

## 2021-11-18 PROCEDURE — 99215 OFFICE O/P EST HI 40 MIN: CPT | Performed by: FAMILY MEDICINE

## 2021-11-18 PROCEDURE — 3074F SYST BP LT 130 MM HG: CPT | Performed by: FAMILY MEDICINE

## 2021-11-18 PROCEDURE — 3078F DIAST BP <80 MM HG: CPT | Performed by: FAMILY MEDICINE

## 2021-11-18 PROCEDURE — 90686 IIV4 VACC NO PRSV 0.5 ML IM: CPT | Performed by: FAMILY MEDICINE

## 2021-11-18 RX ORDER — METFORMIN HYDROCHLORIDE 500 MG/1
500 TABLET, EXTENDED RELEASE ORAL
Qty: 90 TABLET | Refills: 1 | Status: SHIPPED | OUTPATIENT
Start: 2021-11-18 | End: 2021-11-25

## 2021-11-18 NOTE — PATIENT INSTRUCTIONS
Restart metformin    Continue to limit sugars in diet - especially bread, rice, juice, iced tea, lemonade  Use diet and cut it in half with water    Complete rest of blood work prior to surgery    Followup in 2 months, sooner if needed    Call surgeon wi

## 2021-11-18 NOTE — PROGRESS NOTES
Patient presents with:  Pre-Op Exam: Patient is scheduled for Left knee arthroscopy with partial medial meniscectomy and chrondroplasty with Dr. Velta Schaumann. Mary Carmen Rogers under general anesthesia. on 11/23/2021 at [facility]  Diabetes: Patient has been off metfor Occupational History      Not on file    Tobacco Use      Smoking status: Never Smoker      Smokeless tobacco: Never Used    Vaping Use      Vaping Use: Never used    Substance and Sexual Activity      Alcohol use: Never      Drug use: Never      Sexual ac knee    -Patient is at acceptable risk for planned procedure and is optimized for planned procedure at [facility].  -avoid all nsaid/blood thinner products for 7 days prior to the procedure  -NPO after midnight day of procedure  -preop labs not indicated

## 2021-11-18 NOTE — TELEPHONE ENCOUNTER
Faxed History and physical to [facility]  Testing to fax # [phone] and [phone]

## 2021-11-20 ENCOUNTER — LAB ENCOUNTER (OUTPATIENT)
Dept: LAB | Age: 56
End: 2021-11-20
Attending: ORTHOPAEDIC SURGERY
Payer: COMMERCIAL

## 2021-11-20 DIAGNOSIS — S83.249A ACUTE TEAR MEDIAL MENISCUS: ICD-10-CM

## 2021-11-22 ENCOUNTER — TELEPHONE (OUTPATIENT)
Dept: ORTHOPEDICS CLINIC | Facility: CLINIC | Age: 56
End: 2021-11-22

## 2021-11-22 ENCOUNTER — ANESTHESIA EVENT (OUTPATIENT)
Dept: SURGERY | Facility: HOSPITAL | Age: 56
End: 2021-11-22
Payer: COMMERCIAL

## 2021-11-22 NOTE — TELEPHONE ENCOUNTER
Patient called stating he needs a work note stating he is having surgery on 11/23/21 and the type of surgery. Patient will call back with specific information of the note and fax number.

## 2021-11-23 ENCOUNTER — ANESTHESIA (OUTPATIENT)
Dept: SURGERY | Facility: HOSPITAL | Age: 56
End: 2021-11-23
Payer: COMMERCIAL

## 2021-11-23 ENCOUNTER — HOSPITAL ENCOUNTER (OUTPATIENT)
Facility: HOSPITAL | Age: 56
Setting detail: HOSPITAL OUTPATIENT SURGERY
Discharge: HOME | End: 2021-11-23
Attending: ORTHOPAEDIC SURGERY | Admitting: ORTHOPAEDIC SURGERY
Payer: COMMERCIAL

## 2021-11-23 VITALS
OXYGEN SATURATION: 98 % | RESPIRATION RATE: 18 BRPM | BODY MASS INDEX: 33.51 KG/M2 | HEART RATE: 74 BPM | WEIGHT: 247.38 LBS | HEIGHT: 72 IN | DIASTOLIC BLOOD PRESSURE: 80 MMHG | TEMPERATURE: 97 F | SYSTOLIC BLOOD PRESSURE: 129 MMHG

## 2021-11-23 DIAGNOSIS — M94.262 CHONDROMALACIA, LEFT KNEE: ICD-10-CM

## 2021-11-23 DIAGNOSIS — S83.242A TEAR OF MEDIAL MENISCUS OF LEFT KNEE, CURRENT, UNSPECIFIED TEAR TYPE, INITIAL ENCOUNTER: ICD-10-CM

## 2021-11-23 DIAGNOSIS — S83.249A ACUTE TEAR MEDIAL MENISCUS: Primary | ICD-10-CM

## 2021-11-23 PROCEDURE — 0SBD4ZZ EXCISION OF LEFT KNEE JOINT, PERCUTANEOUS ENDOSCOPIC APPROACH: ICD-10-PCS | Performed by: ORTHOPAEDIC SURGERY

## 2021-11-23 PROCEDURE — 82962 GLUCOSE BLOOD TEST: CPT

## 2021-11-23 RX ORDER — ONDANSETRON 2 MG/ML
INJECTION INTRAMUSCULAR; INTRAVENOUS AS NEEDED
Status: DISCONTINUED | OUTPATIENT
Start: 2021-11-23 | End: 2021-11-23 | Stop reason: SURG

## 2021-11-23 RX ORDER — ACETAMINOPHEN 500 MG
1000 TABLET ORAL ONCE
Status: DISCONTINUED | OUTPATIENT
Start: 2021-11-23 | End: 2021-11-23

## 2021-11-23 RX ORDER — CEFAZOLIN SODIUM/WATER 2 G/20 ML
2 SYRINGE (ML) INTRAVENOUS ONCE
Status: COMPLETED | OUTPATIENT
Start: 2021-11-23 | End: 2021-11-23

## 2021-11-23 RX ORDER — MIDAZOLAM HYDROCHLORIDE 1 MG/ML
1 INJECTION INTRAMUSCULAR; INTRAVENOUS EVERY 5 MIN PRN
Status: DISCONTINUED | OUTPATIENT
Start: 2021-11-23 | End: 2021-11-23

## 2021-11-23 RX ORDER — HYDROMORPHONE HYDROCHLORIDE 1 MG/ML
0.4 INJECTION, SOLUTION INTRAMUSCULAR; INTRAVENOUS; SUBCUTANEOUS EVERY 5 MIN PRN
Status: DISCONTINUED | OUTPATIENT
Start: 2021-11-23 | End: 2021-11-23

## 2021-11-23 RX ORDER — NALOXONE HYDROCHLORIDE 0.4 MG/ML
80 INJECTION, SOLUTION INTRAMUSCULAR; INTRAVENOUS; SUBCUTANEOUS AS NEEDED
Status: DISCONTINUED | OUTPATIENT
Start: 2021-11-23 | End: 2021-11-23

## 2021-11-23 RX ORDER — LABETALOL HYDROCHLORIDE 5 MG/ML
5 INJECTION, SOLUTION INTRAVENOUS EVERY 5 MIN PRN
Status: DISCONTINUED | OUTPATIENT
Start: 2021-11-23 | End: 2021-11-23

## 2021-11-23 RX ORDER — ACETAMINOPHEN AND CODEINE PHOSPHATE 300; 30 MG/1; MG/1
1 TABLET ORAL EVERY 4 HOURS PRN
Qty: 30 TABLET | Refills: 0 | Status: SHIPPED | OUTPATIENT
Start: 2021-11-23

## 2021-11-23 RX ORDER — ONDANSETRON 2 MG/ML
4 INJECTION INTRAMUSCULAR; INTRAVENOUS AS NEEDED
Status: DISCONTINUED | OUTPATIENT
Start: 2021-11-23 | End: 2021-11-23

## 2021-11-23 RX ORDER — POVIDONE-IODINE 10 MG/G
OINTMENT TOPICAL AS NEEDED
Status: DISCONTINUED | OUTPATIENT
Start: 2021-11-23 | End: 2021-11-23 | Stop reason: HOSPADM

## 2021-11-23 RX ORDER — MEPERIDINE HYDROCHLORIDE 25 MG/ML
12.5 INJECTION INTRAMUSCULAR; INTRAVENOUS; SUBCUTANEOUS AS NEEDED
Status: DISCONTINUED | OUTPATIENT
Start: 2021-11-23 | End: 2021-11-23

## 2021-11-23 RX ORDER — BUPIVACAINE HYDROCHLORIDE AND EPINEPHRINE 2.5; 5 MG/ML; UG/ML
INJECTION, SOLUTION EPIDURAL; INFILTRATION; INTRACAUDAL; PERINEURAL AS NEEDED
Status: DISCONTINUED | OUTPATIENT
Start: 2021-11-23 | End: 2021-11-23 | Stop reason: HOSPADM

## 2021-11-23 RX ORDER — DEXAMETHASONE SODIUM PHOSPHATE 4 MG/ML
VIAL (ML) INJECTION AS NEEDED
Status: DISCONTINUED | OUTPATIENT
Start: 2021-11-23 | End: 2021-11-23 | Stop reason: SURG

## 2021-11-23 RX ORDER — SODIUM CHLORIDE, SODIUM LACTATE, POTASSIUM CHLORIDE, CALCIUM CHLORIDE 600; 310; 30; 20 MG/100ML; MG/100ML; MG/100ML; MG/100ML
INJECTION, SOLUTION INTRAVENOUS CONTINUOUS
Status: DISCONTINUED | OUTPATIENT
Start: 2021-11-23 | End: 2021-11-23

## 2021-11-23 RX ORDER — MORPHINE SULFATE 2 MG/ML
INJECTION, SOLUTION INTRAMUSCULAR; INTRAVENOUS AS NEEDED
Status: DISCONTINUED | OUTPATIENT
Start: 2021-11-23 | End: 2021-11-23 | Stop reason: HOSPADM

## 2021-11-23 RX ORDER — ACETAMINOPHEN 500 MG
1000 TABLET ORAL ONCE
COMMUNITY

## 2021-11-23 RX ORDER — KETOROLAC TROMETHAMINE 30 MG/ML
INJECTION, SOLUTION INTRAMUSCULAR; INTRAVENOUS AS NEEDED
Status: DISCONTINUED | OUTPATIENT
Start: 2021-11-23 | End: 2021-11-23 | Stop reason: SURG

## 2021-11-23 RX ORDER — METOCLOPRAMIDE HYDROCHLORIDE 5 MG/ML
INJECTION INTRAMUSCULAR; INTRAVENOUS AS NEEDED
Status: DISCONTINUED | OUTPATIENT
Start: 2021-11-23 | End: 2021-11-23 | Stop reason: SURG

## 2021-11-23 RX ORDER — LIDOCAINE HYDROCHLORIDE 10 MG/ML
INJECTION, SOLUTION INFILTRATION; PERINEURAL AS NEEDED
Status: DISCONTINUED | OUTPATIENT
Start: 2021-11-23 | End: 2021-11-23 | Stop reason: HOSPADM

## 2021-11-23 RX ORDER — HYDROCODONE BITARTRATE AND ACETAMINOPHEN 5; 325 MG/1; MG/1
2 TABLET ORAL AS NEEDED
Status: DISCONTINUED | OUTPATIENT
Start: 2021-11-23 | End: 2021-11-23

## 2021-11-23 RX ORDER — HYDROCODONE BITARTRATE AND ACETAMINOPHEN 5; 325 MG/1; MG/1
1 TABLET ORAL AS NEEDED
Status: DISCONTINUED | OUTPATIENT
Start: 2021-11-23 | End: 2021-11-23

## 2021-11-23 RX ADMIN — CEFAZOLIN SODIUM/WATER 2 G: 2 G/20 ML SYRINGE (ML) INTRAVENOUS at 07:22:00

## 2021-11-23 RX ADMIN — SODIUM CHLORIDE, SODIUM LACTATE, POTASSIUM CHLORIDE, CALCIUM CHLORIDE: 600; 310; 30; 20 INJECTION, SOLUTION INTRAVENOUS at 08:20:00

## 2021-11-23 RX ADMIN — SODIUM CHLORIDE, SODIUM LACTATE, POTASSIUM CHLORIDE, CALCIUM CHLORIDE: 600; 310; 30; 20 INJECTION, SOLUTION INTRAVENOUS at 07:00:00

## 2021-11-23 RX ADMIN — KETOROLAC TROMETHAMINE 30 MG: 30 INJECTION, SOLUTION INTRAMUSCULAR; INTRAVENOUS at 08:07:00

## 2021-11-23 RX ADMIN — ONDANSETRON 4 MG: 2 INJECTION INTRAMUSCULAR; INTRAVENOUS at 08:07:00

## 2021-11-23 RX ADMIN — METOCLOPRAMIDE HYDROCHLORIDE 10 MG: 5 INJECTION INTRAMUSCULAR; INTRAVENOUS at 07:22:00

## 2021-11-23 RX ADMIN — DEXAMETHASONE SODIUM PHOSPHATE 8 MG: 4 MG/ML VIAL (ML) INJECTION at 07:22:00

## 2021-11-23 NOTE — OPERATIVE REPORT
[facility] OPERATIVE NOTE    Patient Name: Danya Calloway    YOB: 1965    Preoperative Diagnosis: Tear of medial meniscus of left knee, current, unspecified tear type, initial encounter [S83.242A]  Chondromalacia, left knee [Y15.696 complications, any of which could be serious or life-threatening. Given the patients good health, the risk of the serious complications is felt to be low but not zero. The patient expressed understanding and elected to proceed.   Pre-operative medical marii carried out along with gentle chondroplasty of the medial femoral condyle adjacent to the tear.   This included the combined use of duckbill biting instruments, followed by a 4-0 aggressive synovial resector until a stable margin was achieved at the midbody for joint access, wound closure and dressing application.      Art Yanez MD  8:13 AM  11/23/21

## 2021-11-23 NOTE — BRIEF OP NOTE
Pre-Operative Diagnosis: Tear of medial meniscus of left knee, current, unspecified tear type, initial encounter [S83.242A]  Chondromalacia, left knee [Y62.829]     Post-Operative Diagnosis: Tear of medial meniscus of left knee, current, unspecified tear t

## 2021-11-23 NOTE — ANESTHESIA POSTPROCEDURE EVALUATION
[id number] Patient Status:  Hospital Outpatient Surgery   Age/Gender 49year old male MRN [id number]   [facility] SURGERY Attending Lei Recinos MD   Hosp Day # 0 PCP Marshal Hagen MD       Anesthesia Post-op Note

## 2021-11-23 NOTE — ANESTHESIA PREPROCEDURE EVALUATION
PRE-OP EVALUATION    Patient Name: Denise Hearn    Admit Diagnosis: Tear of medial meniscus of left knee, current, unspecified tear type, initial encounter [S83.242A]  Chondromalacia, left knee [O03.163]    Pre-op Diagnosis: Tear of medial meniscus of 11/20/2021            Airway      Mallampati: II  Mouth opening: >3 FB  TM distance: 4 - 6 cm  Neck ROM: full Cardiovascular    Cardiovascular exam normal.         Dental    No notable dental history.          Pulmonary    Pulmonary exam normal.

## 2021-11-23 NOTE — ANESTHESIA PROCEDURE NOTES
Airway  Date/Time: 11/23/2021 7:05 AM  Urgency: elective      General Information and Staff    Patient location during procedure: OR  Anesthesiologist: Rosalva Xie MD  Performed: anesthesiologist     Indications and Patient Condition  Indications for air

## 2021-11-23 NOTE — H&P
Orthopaedic H&P Update    H&P performed by Dr Walt Rajan on 11/18/21 was reviewed by Hernan Byrd MD on 11/23/2021, the patient was examined and no significant changes have occurred in the patient's condition since the H&P was performed.   Risks and benef

## 2021-11-24 ENCOUNTER — TELEPHONE (OUTPATIENT)
Dept: ORTHOPEDICS CLINIC | Facility: CLINIC | Age: 56
End: 2021-11-24

## 2021-11-24 NOTE — TELEPHONE ENCOUNTER
Called patient to see how he is doing post op surgery.  Was unable to get a hold of him LVM for him to return call back

## 2021-12-01 ENCOUNTER — OFFICE VISIT (OUTPATIENT)
Dept: ORTHOPEDICS CLINIC | Facility: CLINIC | Age: 56
End: 2021-12-01
Payer: COMMERCIAL

## 2021-12-01 VITALS — BODY MASS INDEX: 34 KG/M2 | HEIGHT: 72 IN | WEIGHT: 251 LBS

## 2021-12-01 DIAGNOSIS — Z98.890 STATUS POST ARTHROSCOPY OF LEFT KNEE: ICD-10-CM

## 2021-12-01 DIAGNOSIS — Z48.89 AFTERCARE FOLLOWING SURGERY: ICD-10-CM

## 2021-12-01 DIAGNOSIS — M79.89 LEFT LEG SWELLING: Primary | ICD-10-CM

## 2021-12-01 PROCEDURE — 99024 POSTOP FOLLOW-UP VISIT: CPT | Performed by: PHYSICIAN ASSISTANT

## 2021-12-01 PROCEDURE — 3008F BODY MASS INDEX DOCD: CPT | Performed by: PHYSICIAN ASSISTANT

## 2021-12-01 NOTE — PROGRESS NOTES
[facility] Post Op Progress Note      Date of Surgery: 11/23/2021      Subjective: Isabella Theodore is a 49year old male who is here for reevaluation of his left knee.   He underwent left knee arthroscopy for partial medial meniscectomy and chondroplasty a

## 2021-12-07 ENCOUNTER — TELEPHONE (OUTPATIENT)
Dept: ORTHOPEDICS CLINIC | Facility: CLINIC | Age: 56
End: 2021-12-07

## 2021-12-07 ENCOUNTER — HOSPITAL ENCOUNTER (EMERGENCY)
Facility: HOSPITAL | Age: 56
Discharge: HOME | End: 2021-12-07
Attending: EMERGENCY MEDICINE
Payer: COMMERCIAL

## 2021-12-07 ENCOUNTER — HOSPITAL ENCOUNTER (OUTPATIENT)
Dept: ULTRASOUND IMAGING | Facility: HOSPITAL | Age: 56
Discharge: HOME | End: 2021-12-07
Attending: PHYSICIAN ASSISTANT
Payer: COMMERCIAL

## 2021-12-07 ENCOUNTER — TELEPHONE (OUTPATIENT)
Dept: FAMILY MEDICINE CLINIC | Facility: CLINIC | Age: 56
End: 2021-12-07

## 2021-12-07 VITALS
HEART RATE: 70 BPM | HEIGHT: 71 IN | DIASTOLIC BLOOD PRESSURE: 78 MMHG | OXYGEN SATURATION: 98 % | WEIGHT: 248 LBS | RESPIRATION RATE: 14 BRPM | TEMPERATURE: 97 F | BODY MASS INDEX: 34.72 KG/M2 | SYSTOLIC BLOOD PRESSURE: 154 MMHG

## 2021-12-07 DIAGNOSIS — I82.402 ACUTE DEEP VEIN THROMBOSIS (DVT) OF LEFT LOWER EXTREMITY, UNSPECIFIED VEIN (HCC): Primary | ICD-10-CM

## 2021-12-07 DIAGNOSIS — M79.89 LEFT LEG SWELLING: ICD-10-CM

## 2021-12-07 DIAGNOSIS — I82.4Y9 ACUTE DEEP VEIN THROMBOSIS (DVT) OF PROXIMAL VEIN OF LOWER EXTREMITY, UNSPECIFIED LATERALITY (HCC): Primary | ICD-10-CM

## 2021-12-07 PROCEDURE — 93971 EXTREMITY STUDY: CPT | Performed by: PHYSICIAN ASSISTANT

## 2021-12-07 PROCEDURE — 99283 EMERGENCY DEPT VISIT LOW MDM: CPT

## 2021-12-07 NOTE — TELEPHONE ENCOUNTER
US doppler- Results  Radiologist requesting to speak to Priscilla Cyr directly  Message sent to Priscilla Cyr   Phone # [phone]  Priscilla Cyr aware and will call radiology

## 2021-12-07 NOTE — ED PROVIDER NOTES
Patient Seen in: [facility] Emergency Department      History   Patient presents with:  Deep Vein Thrombosis    Stated Complaint: Diagnosed DVT    Subjective:   HPI    31-year-old male presents emergency room with chief complaint of left calf swelli rhonchi, no wheezing, no stridor. ABDOMEN: Soft, nondistended,non tender  EXTREMITIES: Trace left pretibial edema, mild left calf tenderness, dorsal pedal pulses present and equal bilaterally. SKIN: Warm, dry, intact, no rashes.           ED Course   Labs

## 2021-12-07 NOTE — TELEPHONE ENCOUNTER
Patient called back stating he thought about leaving but will wait in the ED for evaluation. He has been taking a full dose ASA but states he has forgotten a few days.   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

## 2021-12-07 NOTE — TELEPHONE ENCOUNTER
Sent in eliquis for DVT treatment. If insurance doesn't cover this, let us know. Have him schedule appt with me in 1 wk (ok to use virtual spot)    Also referred to blood specialist.  Have him call to followup with them in the next 3-4 wks.     Will rou

## 2021-12-07 NOTE — TELEPHONE ENCOUNTER
Patient called back stating he was told there was a 3 hour wait for the ED for evaluation of his positive DVT diagnosis. He states that he does not want to wait and needs to go home and eat and get ready for work.  I again explained the risk of not being e

## 2021-12-07 NOTE — TELEPHONE ENCOUNTER
Spoke with [facility] radiologist who states his US was positive for DVT. Spoke with Dr. Brian Bender who advised follow up in ED. I called patient to discuss plan and the registration member will escort him to the ER.   Patient understands and I explained ri

## 2021-12-07 NOTE — TELEPHONE ENCOUNTER
Yoko Marquez from [address] [place] [address] (Dr. Milton Calix office) called and said pt has developed a DVT and Yoko Marquez wants to know Dr. Ca Whitaker protocol.

## 2021-12-07 NOTE — ED INITIAL ASSESSMENT (HPI)
Pt presents to the ED with complaints of swelling to left leg s/p meniscus repair on 11/23. Pt states he has had swelling since 12/1 and today was sent for an [address], told + for DVT and sent here. Pt awake and alert, skin w/d,resps reg/unlabored.

## 2021-12-08 NOTE — TELEPHONE ENCOUNTER
Dee Owens, [address] [place] [address] [address] [place] [address] [phone]     Relayed recommendations to patient. He VU and agreed with plan. He requested information also be sent to his mychart.

## 2021-12-08 NOTE — CM/SW NOTE
Spoke to the patient at the bedside about eliquis which he will be taking for a dvt. I provided the patient with a free 30 day coupon and a 10 dollar co pay card. He has never taken eliquis before.  Pt instructed to look out for signs of bleeding, excessive

## 2021-12-15 ENCOUNTER — OFFICE VISIT (OUTPATIENT)
Dept: ORTHOPEDICS CLINIC | Facility: CLINIC | Age: 56
End: 2021-12-15
Payer: COMMERCIAL

## 2021-12-15 VITALS — BODY MASS INDEX: 34.72 KG/M2 | HEIGHT: 71 IN | WEIGHT: 248 LBS

## 2021-12-15 DIAGNOSIS — I82.442 ACUTE DEEP VEIN THROMBOSIS (DVT) OF TIBIAL VEIN OF LEFT LOWER EXTREMITY (HCC): Primary | ICD-10-CM

## 2021-12-15 DIAGNOSIS — Z98.890 S/P RIGHT KNEE ARTHROSCOPY: ICD-10-CM

## 2021-12-15 PROCEDURE — 3008F BODY MASS INDEX DOCD: CPT | Performed by: ORTHOPAEDIC SURGERY

## 2021-12-15 PROCEDURE — 99024 POSTOP FOLLOW-UP VISIT: CPT | Performed by: ORTHOPAEDIC SURGERY

## 2021-12-15 NOTE — PROGRESS NOTES
[facility] Post-op Progress Note      Date of Surgery: 11/23/2021    History: Saurabh Rodriguez is a 32-year-old male presenting for postop follow-up approximately 3 weeks status post left knee arthroscopy.   His postoperative course was complicated by a calf Surgery    The dictation was partially prepared using [employer] [facility] voice recognition software. Errors may occur, which have been corrected when identified.

## 2021-12-23 ENCOUNTER — OFFICE VISIT (OUTPATIENT)
Dept: HEMATOLOGY/ONCOLOGY | Age: 56
End: 2021-12-23
Attending: INTERNAL MEDICINE
Payer: COMMERCIAL

## 2021-12-23 VITALS
WEIGHT: 251.31 LBS | BODY MASS INDEX: 35.18 KG/M2 | RESPIRATION RATE: 18 BRPM | TEMPERATURE: 97 F | HEART RATE: 79 BPM | HEIGHT: 71.02 IN | DIASTOLIC BLOOD PRESSURE: 87 MMHG | OXYGEN SATURATION: 96 % | SYSTOLIC BLOOD PRESSURE: 148 MMHG

## 2021-12-23 DIAGNOSIS — I82.4Y9 ACUTE DEEP VEIN THROMBOSIS (DVT) OF PROXIMAL VEIN OF LOWER EXTREMITY, UNSPECIFIED LATERALITY (HCC): ICD-10-CM

## 2021-12-23 PROCEDURE — 99205 OFFICE O/P NEW HI 60 MIN: CPT | Performed by: INTERNAL MEDICINE

## 2021-12-23 NOTE — CONSULTS
[facility] Report of Consultation    Patient Name: Maribell Gresham   YOB: 1965   Medical Record Number: [id number]   CSN: [id number]   Consulting Physician: Jessenia Ford M.D.    Referring Physician: Cara Mireles    Date of Consu Topics      Concerns:         Service: Not Asked        Blood Transfusions: Not Asked        Caffeine Concern: Yes          1 cup of coffee daily        Occupational Exposure: Not Asked        Hobby Hazards: Not Asked        Sleep Concern: Not Aske reported), Disp: 100 strip, Rfl: 1  •  Blood Glucose Monitoring Suppl (FREESTYLE FREEDOM LITE) w/Device Does not apply Kit, 1 Device by Other route daily. Use as directed.  Dx: 11.65 (Patient not taking: No sig reported), Disp: 1 kit, Rfl: 0    Allergies: normal.   Neck Normal - Supple without masses or thyromegaly. Hematologic/Lymphatic Normal - No petechiae or purpura. No tender or palpable lymph nodes in the cervical, supraclavicular, axillary or inguinal area.    Respiratory Normal - Lungs are clear t by:    Jenny Galindo M.D.   [facility]

## 2021-12-28 ENCOUNTER — PATIENT OUTREACH (OUTPATIENT)
Dept: FAMILY MEDICINE CLINIC | Facility: CLINIC | Age: 56
End: 2021-12-28

## 2021-12-28 NOTE — PROGRESS NOTES
Patient is due for Wellness Visit. Left message for patient to call office. Patient needs appointment.

## 2022-01-19 ENCOUNTER — OFFICE VISIT (OUTPATIENT)
Dept: ORTHOPEDICS CLINIC | Facility: CLINIC | Age: 57
End: 2022-01-19
Payer: COMMERCIAL

## 2022-01-19 VITALS — WEIGHT: 251 LBS | BODY MASS INDEX: 35.14 KG/M2 | HEIGHT: 71 IN

## 2022-01-19 DIAGNOSIS — I82.442 ACUTE DEEP VEIN THROMBOSIS (DVT) OF TIBIAL VEIN OF LEFT LOWER EXTREMITY (HCC): ICD-10-CM

## 2022-01-19 DIAGNOSIS — Z98.890 S/P RIGHT KNEE ARTHROSCOPY: Primary | ICD-10-CM

## 2022-01-19 PROCEDURE — 3008F BODY MASS INDEX DOCD: CPT | Performed by: ORTHOPAEDIC SURGERY

## 2022-01-19 PROCEDURE — 99024 POSTOP FOLLOW-UP VISIT: CPT | Performed by: ORTHOPAEDIC SURGERY

## 2022-01-19 NOTE — PROGRESS NOTES
[facility] Post-op Progress Note      Date of Surgery: 11/23/21    History: Mr. Saji Atkinson is a 28-year-old -American male presenting for postop follow-up of his left knee.   Overall he is functioning reasonably but still has some mild stiffne been corrected when identified.

## 2022-01-26 ENCOUNTER — TELEPHONE (OUTPATIENT)
Dept: ORTHOPEDICS CLINIC | Facility: CLINIC | Age: 57
End: 2022-01-26

## 2022-02-16 ENCOUNTER — TELEPHONE (OUTPATIENT)
Dept: ORTHOPEDICS CLINIC | Facility: CLINIC | Age: 57
End: 2022-02-16

## 2022-02-16 NOTE — TELEPHONE ENCOUNTER
Patient said that someone called him this morning to schedule a follow up appointment but he missed the call. Patient said that the rep would be able to get him in tomorrow to see Dr. Patricia Quintana but I checked and nothing opened and no documentation in his chart.

## 2022-02-17 ENCOUNTER — OFFICE VISIT (OUTPATIENT)
Dept: ORTHOPEDICS CLINIC | Facility: CLINIC | Age: 57
End: 2022-02-17
Payer: COMMERCIAL

## 2022-02-17 DIAGNOSIS — Z48.89 AFTERCARE FOLLOWING SURGERY: ICD-10-CM

## 2022-02-17 DIAGNOSIS — Z98.890 STATUS POST ARTHROSCOPY OF LEFT KNEE: Primary | ICD-10-CM

## 2022-02-17 PROCEDURE — 99024 POSTOP FOLLOW-UP VISIT: CPT | Performed by: ORTHOPAEDIC SURGERY

## 2022-02-17 NOTE — PROGRESS NOTES
[facility] Post-op Progress Note      Date of Surgery: 11/23/21    History: Mr. Rhea Randall is a 61-year-old -American male presenting for postop follow-up of his left knee. At his last visit he was prescribed physical therapy in hopes of regaining functional range of motion but he did not initiate this due to cost.  Instead he began some self directed exercise including some squats which resulted in significant anterior medial pain bilaterally. Left is slightly worse than the right. No new catching, locking or swelling is reported. He is recovering from a postop DVT on this side. Physical Exam: Left knee reveals no warmth or effusion. There is patellofemoral click and crepitus with active range of motion from 0 to 120 degrees, 130 degrees passively. There is some mild medial joint line tenderness but a stable knee in all planes. Minimal visual distal edema is noted. Assessment: Status post left knee arthroscopy with partial medial meniscectomy and chondroplasty with postop calf DVT    Plan: Overall Mr. Rhea Randall continues to have some end range stiffness. Unfortunately some of the squatting exercises likely exacerbated patellofemoral symptoms. This apparently was bilateral.  Given his reluctance to initiate physical therapy due to cost, I recommend a gentle home program focusing on end range of motion stretching in flexion. Close kinetic chain strength and conditioning exercises may be continue with no squats, lunges, leg presses or impact activities. Pivoting and twisting activities should also be avoided. Long-term, he will continue to benefit from activity protection and weight loss. At this point, he is considering changing his vocation as a [employer]  which is highly stressful to his knees. He will contact us if there are any need for work restriction notes. He may otherwise follow-up with us for reassessment if symptoms not improving over the next 4 to 6 weeks.     Jennifer Allen, MD  [address] [place] [address] Surgery    The dictation was partially prepared using [employer] voice recognition software. Errors may occur, which have been corrected when identified.

## 2022-02-25 ENCOUNTER — TELEPHONE (OUTPATIENT)
Dept: ORTHOPEDICS CLINIC | Facility: CLINIC | Age: 57
End: 2022-02-25

## 2022-02-25 NOTE — TELEPHONE ENCOUNTER
Patient is requested a work note with some restrictions. patient thinks that 20 lbs of lifting is too much. 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 He job is     Dr. Denise Cordova last [address] notes stated that the following activities should be avoided, \". ..squats, lunges, leg presses or impact activities. Pivoting and twisting activities should also be avoided. \"      Patient will retreive work note (letter) from [address] Also, can this work note be backdated to 2/17/22? Patient does not have a follow up visit scheduled at th is time, but was instructed to follow up in 4-6 weeks is symptoms fail to improve.

## 2022-03-23 ENCOUNTER — OFFICE VISIT (OUTPATIENT)
Dept: HEMATOLOGY/ONCOLOGY | Age: 57
End: 2022-03-23
Attending: INTERNAL MEDICINE
Payer: COMMERCIAL

## 2022-03-23 VITALS
DIASTOLIC BLOOD PRESSURE: 87 MMHG | OXYGEN SATURATION: 97 % | BODY MASS INDEX: 36.72 KG/M2 | SYSTOLIC BLOOD PRESSURE: 148 MMHG | TEMPERATURE: 97 F | HEART RATE: 71 BPM | HEIGHT: 71.02 IN | RESPIRATION RATE: 18 BRPM | WEIGHT: 262.31 LBS

## 2022-03-23 DIAGNOSIS — I82.4Y9 ACUTE DEEP VEIN THROMBOSIS (DVT) OF PROXIMAL VEIN OF LOWER EXTREMITY, UNSPECIFIED LATERALITY (HCC): ICD-10-CM

## 2022-03-23 LAB — D DIMER PPP FEU-MCNC: 0.28 UG/ML FEU (ref ?–0.54)

## 2022-03-23 PROCEDURE — 99213 OFFICE O/P EST LOW 20 MIN: CPT | Performed by: INTERNAL MEDICINE

## 2022-03-23 NOTE — PROGRESS NOTES
Patient is here for MD f/u. Hx of DVT. Patient c/o occasional tingling and swelling in his left leg. He has been off Eliquis for over 1.5 months. Patient has not scheduled left leg doppler yet.        Education Record    Learner:  Patient    Disease / Diagnosis:  DVT     Barriers / Limitations:  None   Comments:    Method:  Discussion   Comments:    General Topics:  Plan of care reviewed   Comments:    Outcome:  Shows understanding   Comments:

## 2022-04-06 ENCOUNTER — HOSPITAL ENCOUNTER (OUTPATIENT)
Dept: ULTRASOUND IMAGING | Age: 57
Discharge: HOME | End: 2022-04-06
Attending: INTERNAL MEDICINE
Payer: COMMERCIAL

## 2022-04-06 DIAGNOSIS — I82.4Y9 ACUTE DEEP VEIN THROMBOSIS (DVT) OF PROXIMAL VEIN OF LOWER EXTREMITY, UNSPECIFIED LATERALITY (HCC): ICD-10-CM

## 2022-04-06 PROCEDURE — 93971 EXTREMITY STUDY: CPT | Performed by: INTERNAL MEDICINE

## 2022-06-15 ENCOUNTER — OFFICE VISIT (OUTPATIENT)
Dept: ORTHOPEDICS CLINIC | Facility: CLINIC | Age: 57
End: 2022-06-15
Payer: COMMERCIAL

## 2022-06-15 DIAGNOSIS — M22.42 CHONDROMALACIA OF LEFT PATELLA: Primary | ICD-10-CM

## 2022-06-15 PROCEDURE — 99213 OFFICE O/P EST LOW 20 MIN: CPT | Performed by: ORTHOPAEDIC SURGERY

## 2022-07-15 ENCOUNTER — TELEPHONE (OUTPATIENT)
Dept: PHYSICAL THERAPY | Facility: HOSPITAL | Age: 57
End: 2022-07-15

## 2022-07-18 ENCOUNTER — OFFICE VISIT (OUTPATIENT)
Dept: PHYSICAL THERAPY | Age: 57
End: 2022-07-18
Attending: ORTHOPAEDIC SURGERY
Payer: COMMERCIAL

## 2022-07-18 DIAGNOSIS — M22.42 CHONDROMALACIA OF LEFT PATELLA: ICD-10-CM

## 2022-07-18 PROCEDURE — 97110 THERAPEUTIC EXERCISES: CPT

## 2022-07-18 PROCEDURE — 97161 PT EVAL LOW COMPLEX 20 MIN: CPT

## 2022-07-22 ENCOUNTER — OFFICE VISIT (OUTPATIENT)
Dept: PHYSICAL THERAPY | Age: 57
End: 2022-07-22
Attending: ORTHOPAEDIC SURGERY
Payer: COMMERCIAL

## 2022-07-22 PROCEDURE — 97140 MANUAL THERAPY 1/> REGIONS: CPT

## 2022-07-22 PROCEDURE — 97110 THERAPEUTIC EXERCISES: CPT

## 2022-07-25 ENCOUNTER — OFFICE VISIT (OUTPATIENT)
Dept: PHYSICAL THERAPY | Age: 57
End: 2022-07-25
Attending: ORTHOPAEDIC SURGERY
Payer: COMMERCIAL

## 2022-07-25 PROCEDURE — 97140 MANUAL THERAPY 1/> REGIONS: CPT

## 2022-07-25 PROCEDURE — 97110 THERAPEUTIC EXERCISES: CPT

## 2022-07-29 ENCOUNTER — OFFICE VISIT (OUTPATIENT)
Dept: PHYSICAL THERAPY | Age: 57
End: 2022-07-29
Attending: ORTHOPAEDIC SURGERY
Payer: COMMERCIAL

## 2022-07-29 PROCEDURE — 97140 MANUAL THERAPY 1/> REGIONS: CPT

## 2022-07-29 PROCEDURE — 97110 THERAPEUTIC EXERCISES: CPT

## 2022-08-05 ENCOUNTER — OFFICE VISIT (OUTPATIENT)
Dept: PHYSICAL THERAPY | Age: 57
End: 2022-08-05
Attending: ORTHOPAEDIC SURGERY
Payer: COMMERCIAL

## 2022-08-05 PROCEDURE — 97110 THERAPEUTIC EXERCISES: CPT

## 2022-08-05 PROCEDURE — 97140 MANUAL THERAPY 1/> REGIONS: CPT

## 2022-08-09 ENCOUNTER — OFFICE VISIT (OUTPATIENT)
Dept: PHYSICAL THERAPY | Age: 57
End: 2022-08-09
Attending: ORTHOPAEDIC SURGERY
Payer: COMMERCIAL

## 2022-08-09 PROCEDURE — 97140 MANUAL THERAPY 1/> REGIONS: CPT

## 2022-08-09 PROCEDURE — 97110 THERAPEUTIC EXERCISES: CPT

## 2022-08-12 ENCOUNTER — TELEPHONE (OUTPATIENT)
Dept: PHYSICAL THERAPY | Facility: HOSPITAL | Age: 57
End: 2022-08-12

## 2022-08-12 ENCOUNTER — APPOINTMENT (OUTPATIENT)
Dept: PHYSICAL THERAPY | Age: 57
End: 2022-08-12
Attending: ORTHOPAEDIC SURGERY
Payer: COMMERCIAL

## 2022-08-19 ENCOUNTER — OFFICE VISIT (OUTPATIENT)
Dept: PHYSICAL THERAPY | Age: 57
End: 2022-08-19
Attending: ORTHOPAEDIC SURGERY
Payer: COMMERCIAL

## 2022-08-19 PROCEDURE — 97110 THERAPEUTIC EXERCISES: CPT

## 2023-01-26 ENCOUNTER — APPOINTMENT (OUTPATIENT)
Dept: GENERAL RADIOLOGY | Age: 58
End: 2023-01-26
Attending: PHYSICIAN ASSISTANT
Payer: COMMERCIAL

## 2023-01-26 ENCOUNTER — HOSPITAL ENCOUNTER (OUTPATIENT)
Age: 58
Discharge: HOME | End: 2023-01-26
Payer: COMMERCIAL

## 2023-01-26 VITALS
DIASTOLIC BLOOD PRESSURE: 72 MMHG | HEART RATE: 63 BPM | OXYGEN SATURATION: 98 % | SYSTOLIC BLOOD PRESSURE: 142 MMHG | TEMPERATURE: 98 F | RESPIRATION RATE: 18 BRPM

## 2023-01-26 DIAGNOSIS — S90.455A: Primary | ICD-10-CM

## 2023-01-26 LAB — GLUCOSE BLD-MCNC: 93 MG/DL (ref 70–99)

## 2023-01-26 PROCEDURE — 99214 OFFICE O/P EST MOD 30 MIN: CPT

## 2023-01-26 PROCEDURE — 73660 X-RAY EXAM OF TOE(S): CPT | Performed by: PHYSICIAN ASSISTANT

## 2023-01-26 PROCEDURE — 99213 OFFICE O/P EST LOW 20 MIN: CPT

## 2023-01-26 PROCEDURE — 82962 GLUCOSE BLOOD TEST: CPT

## 2023-01-26 PROCEDURE — 90471 IMMUNIZATION ADMIN: CPT

## 2023-01-26 RX ORDER — KETOCONAZOLE 20 MG/G
1 CREAM TOPICAL 2 TIMES DAILY
Qty: 1 EACH | Refills: 0 | Status: SHIPPED | OUTPATIENT
Start: 2023-01-26 | End: 2023-02-02
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
## 2023-02-01 ENCOUNTER — PATIENT OUTREACH (OUTPATIENT)
Dept: FAMILY MEDICINE CLINIC | Facility: CLINIC | Age: 58
End: 2023-02-01

## 2023-03-02 ENCOUNTER — HOSPITAL ENCOUNTER (OUTPATIENT)
Age: 58
Discharge: HOME | End: 2023-03-02
Attending: EMERGENCY MEDICINE
Payer: COMMERCIAL

## 2023-03-02 VITALS
DIASTOLIC BLOOD PRESSURE: 82 MMHG | RESPIRATION RATE: 18 BRPM | TEMPERATURE: 98 F | HEART RATE: 66 BPM | SYSTOLIC BLOOD PRESSURE: 146 MMHG | OXYGEN SATURATION: 98 %

## 2023-03-02 DIAGNOSIS — S05.01XA ABRASION OF RIGHT CORNEA, INITIAL ENCOUNTER: Primary | ICD-10-CM

## 2023-03-02 PROCEDURE — 99213 OFFICE O/P EST LOW 20 MIN: CPT

## 2023-03-02 PROCEDURE — 99214 OFFICE O/P EST MOD 30 MIN: CPT

## 2023-03-02 RX ORDER — TETRACAINE HYDROCHLORIDE 5 MG/ML
2 SOLUTION OPHTHALMIC ONCE
Status: COMPLETED | OUTPATIENT
Start: 2023-03-02 | End: 2023-03-02

## 2023-03-02 RX ORDER — GENTAMICIN SULFATE 3 MG/ML
2 SOLUTION/ DROPS OPHTHALMIC 4 TIMES DAILY
Qty: 5 ML | Refills: 0 | Status: SHIPPED | OUTPATIENT
Start: 2023-03-02 | End: 2023-03-07
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
## 2023-03-02 NOTE — ED PROVIDER NOTES
Patient Seen in: [facility]      History   Patient presents with: Eye Visual Problem    Stated Complaint: right eye irratation     Subjective:   HPI    82-year-old male presents for evaluation of right eye irritation. Patient accidentally splashed some paint in his eye 1 week ago at work. He went to the eyewash station and irrigated eye thoroughly. However since this time he is still had a little bit of a red eye and some tearing especially in the morning. No contact lens use. No cough or cold. No eye pain or significant visual changes. No facial rash. Objective:   No pertinent past medical history. No pertinent past surgical history. No pertinent social history. Review of Systems    Positive for stated complaint: right eye irratation   Other systems are as noted in HPI. Constitutional and vital signs reviewed. All other systems reviewed and negative except as noted above. Physical Exam     ED Triage Vitals [03/02/23 1547]   /82   Pulse 66   Resp 18   Temp    Temp src    SpO2 98 %   O2 Device None (Room air)       Current:/82   Pulse 66   Resp 18   SpO2 98%     Right Eye Chart Acuity: 20/30, Uncorrected  Left Eye Chart Acuity: 20/30, Uncorrected    Physical Exam  Eyes:      General: Lids are normal. Lids are everted, no foreign bodies appreciated. Vision grossly intact. Gaze aligned appropriately. Right eye: Discharge present. No foreign body or hordeolum. Extraocular Movements: Extraocular movements intact. Conjunctiva/sclera:      Right eye: Right conjunctiva is injected. No chemosis, exudate or hemorrhage. Pupils: Pupils are equal, round, and reactive to light. Right eye: Corneal abrasion and fluorescein uptake present. Ranjeet exam negative. Slit lamp exam:     Right eye: Anterior chamber quiet.       Visual Fields: Right eye visual fields normal and left eye visual fields normal. ED Course   Labs Reviewed - No data to display             MDM       Very small pinpoint uptake possibly from injury earlier in the week. Visual acuity is intact. We will start gentamicin. Patient does not wear contact lenses. Follow-up with Optho, referral provided, if symptoms do not improve by Saturday in 2 days                             Disposition and Plan     Clinical Impression:  Abrasion of right cornea, initial encounter  (primary encounter diagnosis)     Disposition:  Discharge  3/2/2023  4:11 pm    Follow-up:  Yoli Jimenez [address]  [phone]    Call in 2 days  As needed          Medications Prescribed:  Current Discharge Medication List    START taking these medications    gentamicin 0.3 % Ophthalmic Solution  Place 2 drops into the right eye 4 (four) times daily for 5 days.   Qty: 5 mL Refills: 0

## 2023-03-09 ENCOUNTER — PATIENT OUTREACH (OUTPATIENT)
Dept: FAMILY MEDICINE CLINIC | Facility: CLINIC | Age: 58
End: 2023-03-09

## 2023-03-09 NOTE — PROGRESS NOTES
Patient is due for Wellness Visit. Left message for patient to call office. Patient needs appointment.     Last seen 12/28/2023    [facility]  03/09/2023 02/13/2023 02/01/2023 12/28/2021    Letter sent   02/13/2023

## 2023-03-16 ENCOUNTER — PATIENT OUTREACH (OUTPATIENT)
Dept: CASE MANAGEMENT | Age: 58
End: 2023-03-16

## 2023-03-17 NOTE — PROCEDURES
The office order for PCP removal request is Approved and finalized on March 16, 2023.     Thanks,  [facility] [employer]

## 2023-03-27 ENCOUNTER — APPOINTMENT (OUTPATIENT)
Dept: ULTRASOUND IMAGING | Age: 58
End: 2023-03-27
Attending: EMERGENCY MEDICINE
Payer: COMMERCIAL

## 2023-03-27 ENCOUNTER — HOSPITAL ENCOUNTER (OUTPATIENT)
Age: 58
Discharge: HOME | End: 2023-03-27
Attending: EMERGENCY MEDICINE
Payer: COMMERCIAL

## 2023-03-27 VITALS
BODY MASS INDEX: 32 KG/M2 | TEMPERATURE: 97 F | DIASTOLIC BLOOD PRESSURE: 75 MMHG | RESPIRATION RATE: 18 BRPM | WEIGHT: 232 LBS | SYSTOLIC BLOOD PRESSURE: 135 MMHG | HEART RATE: 72 BPM

## 2023-03-27 DIAGNOSIS — M79.609 MUSCULOSKELETAL PAIN OF EXTREMITY: Primary | ICD-10-CM

## 2023-03-27 PROCEDURE — 99213 OFFICE O/P EST LOW 20 MIN: CPT

## 2023-03-27 PROCEDURE — 99214 OFFICE O/P EST MOD 30 MIN: CPT

## 2023-03-27 PROCEDURE — 93971 EXTREMITY STUDY: CPT | Performed by: EMERGENCY MEDICINE

## 2023-03-27 NOTE — DISCHARGE INSTRUCTIONS
Tylenol or Advil as needed follow-up with your doctor over the next 2 days use ice 20 minutes 4 times a day if no improvement switch to heat.

## 2023-03-27 NOTE — ED INITIAL ASSESSMENT (HPI)
C/o left leg swellen for 3 days. Pt reports warmth, swelling, and hard spot on posterior lower left leg. Pt reports hx of blood clot in same area of the leg over 1 year ago. Pt denies use of blood thinner.

## 2023-04-10 ENCOUNTER — HOSPITAL ENCOUNTER (OUTPATIENT)
Dept: GENERAL RADIOLOGY | Age: 58
Discharge: HOME | End: 2023-04-10
Attending: ORTHOPAEDIC SURGERY
Payer: COMMERCIAL

## 2023-04-10 ENCOUNTER — OFFICE VISIT (OUTPATIENT)
Dept: ORTHOPEDICS CLINIC | Facility: CLINIC | Age: 58
End: 2023-04-10
Payer: COMMERCIAL

## 2023-04-10 ENCOUNTER — OFFICE VISIT (OUTPATIENT)
Dept: FAMILY MEDICINE CLINIC | Facility: CLINIC | Age: 58
End: 2023-04-10
Payer: COMMERCIAL

## 2023-04-10 VITALS
DIASTOLIC BLOOD PRESSURE: 70 MMHG | TEMPERATURE: 97 F | HEIGHT: 71.02 IN | WEIGHT: 234 LBS | SYSTOLIC BLOOD PRESSURE: 118 MMHG | BODY MASS INDEX: 32.76 KG/M2 | OXYGEN SATURATION: 99 % | HEART RATE: 68 BPM

## 2023-04-10 VITALS — WEIGHT: 230 LBS | BODY MASS INDEX: 32 KG/M2

## 2023-04-10 DIAGNOSIS — M25.562 CHRONIC PAIN OF LEFT KNEE: ICD-10-CM

## 2023-04-10 DIAGNOSIS — M17.12 PRIMARY OSTEOARTHRITIS OF LEFT KNEE: ICD-10-CM

## 2023-04-10 DIAGNOSIS — G89.29 CHRONIC PAIN OF LEFT KNEE: ICD-10-CM

## 2023-04-10 DIAGNOSIS — Z12.5 ENCOUNTER FOR SCREENING FOR MALIGNANT NEOPLASM OF PROSTATE: ICD-10-CM

## 2023-04-10 DIAGNOSIS — Z00.00 ROUTINE GENERAL MEDICAL EXAMINATION AT A HEALTH CARE FACILITY: Primary | ICD-10-CM

## 2023-04-10 DIAGNOSIS — E11.9 TYPE 2 DIABETES MELLITUS WITHOUT COMPLICATION, WITHOUT LONG-TERM CURRENT USE OF INSULIN (HCC): ICD-10-CM

## 2023-04-10 DIAGNOSIS — M17.12 PRIMARY OSTEOARTHRITIS OF LEFT KNEE: Primary | ICD-10-CM

## 2023-04-10 DIAGNOSIS — Z86.718 HISTORY OF DVT (DEEP VEIN THROMBOSIS): ICD-10-CM

## 2023-04-10 DIAGNOSIS — R07.89 CHEST TIGHTNESS: ICD-10-CM

## 2023-04-10 LAB
CARTRIDGE LOT#: 56 NUMERIC
CREAT UR-SCNC: 188 MG/DL
HEMOGLOBIN A1C: 6.6 % (ref 4.3–5.6)
MICROALBUMIN UR-MCNC: 0.7 MG/DL
MICROALBUMIN/CREAT 24H UR-RTO: 3.7 UG/MG (ref ?–30)

## 2023-04-10 PROCEDURE — 3061F NEG MICROALBUMINURIA REV: CPT | Performed by: FAMILY MEDICINE

## 2023-04-10 PROCEDURE — 73562 X-RAY EXAM OF KNEE 3: CPT | Performed by: ORTHOPAEDIC SURGERY

## 2023-04-10 PROCEDURE — 99214 OFFICE O/P EST MOD 30 MIN: CPT | Performed by: ORTHOPAEDIC SURGERY

## 2023-04-10 PROCEDURE — 82570 ASSAY OF URINE CREATININE: CPT | Performed by: FAMILY MEDICINE

## 2023-04-10 PROCEDURE — 82043 UR ALBUMIN QUANTITATIVE: CPT | Performed by: FAMILY MEDICINE

## 2023-04-10 NOTE — PATIENT INSTRUCTIONS
Go to lab for fasting bloodwork    Call to schedule eye exam    Call insurance to find out how a stress test would be covered for your heart  If you have to pay a high percentage (or all of your deductible), then consider heart scan instead    Otherwise, would recommend stress test to be safe    Continue to work on diet and exercise changes    Followup in 2 months, sooner if needed      --------------------------------------------------------------------    If labs ordered:  -- schedule appt for fasting bloodwork anytime that you are able to (fast for 8-10 hours minimum, no food. Water is fine). -- go to [facility] or use [employer] to schedule [employer]  -- call [facility] or use website to schedule labs if your insurance prefers [facility] (Always confirm your preferred lab with your insurance, and let us know if you need labs ordered at a specific location)  -- we will call with results about 5-7 days after bloodwork is completed    Always verify coverage of any testing or specialist referral with your insurance    Work on healthy nutrition:  -focus on plant based, low-fat proteins  -limit fatty, red, or processed meats  -decrease carbohydrates (bread, rice, pasta, tortillas, sweets, sodas, juice, energy drinks)  -eat more fruits and veggies  -1/2 of every meal should be fruits/veggies; 1/4 should be protein, only 1/4 should be carbohydrates  -can work on decreasing portion sizes with each meal and drink plenty of water with each meal   -eat slowly - the brain can take up to 20min to realize stomach is full (easier to overeat when you eat fast)  -try to eat consistently throughout the day - can use healthy proteins or fiber rich foods for snacks in between meals (nuts, oatmeal, fruits, veggies)  -can you calorie tracking apps (myfitness pal or similar) to everything you eat for up to 2 wks to get a sense of what you are eating    Increase exercise:  -goal is 20-30min of continuous cardio (increased heart-rate and sweating) for 3-4 times per week  -work your way slowly up to this  -can focus on low impact exercises (elliptical, cycling, swimming) if you have joint pains with walking/jogging/running    For sleep:  -make sure room is dark and quiet  -no reading, tv, phone, tablets, computers in bed - these can activate the brain over time and associate being awake with being in the bed  -if you are not sleeping, leave the bedroom, do any of the above until you are tired, then try again  -this will help reinforce with the brain the the bed is for sleeping  -consider melatonin 5-6mg nightly for 4-6 wks to help with sleep  -can use OTC benadryl or unisom as needed on top of this    Skin health:  -always use sunscreen (30+ spf) if out in the sun for longer than 10-15min  -cover up if needed  -reapply sunscreen every 2 hours  -consider seeing a dermatologist for a full skin exam every 1-2 years if you have had a lot of sun exposure in your life or if you have a lot of moles

## 2023-04-11 ENCOUNTER — TELEPHONE (OUTPATIENT)
Dept: ORTHOPEDICS CLINIC | Facility: CLINIC | Age: 58
End: 2023-04-11

## 2023-04-12 NOTE — TELEPHONE ENCOUNTER
Lvm for patient to call back and schedule Injection, with  or TIGIST Boyd. Patient  needs to be scheduled 3  weeks ahead to assure that the medication will be there for the appt. Please forward TE back to me with Date,Time and Location.     Tony Guzmán

## 2023-05-03 ENCOUNTER — OFFICE VISIT (OUTPATIENT)
Dept: ORTHOPEDICS CLINIC | Facility: CLINIC | Age: 58
End: 2023-05-03
Payer: COMMERCIAL

## 2023-05-03 ENCOUNTER — TELEPHONE (OUTPATIENT)
Dept: ORTHOPEDICS CLINIC | Facility: CLINIC | Age: 58
End: 2023-05-03

## 2023-05-03 DIAGNOSIS — M17.12 PRIMARY OSTEOARTHRITIS OF LEFT KNEE: Primary | ICD-10-CM

## 2023-05-03 DIAGNOSIS — G89.29 CHRONIC PAIN OF RIGHT KNEE: ICD-10-CM

## 2023-05-03 DIAGNOSIS — M17.11 PRIMARY OSTEOARTHRITIS OF RIGHT KNEE: Primary | ICD-10-CM

## 2023-05-03 DIAGNOSIS — M25.561 CHRONIC PAIN OF RIGHT KNEE: ICD-10-CM

## 2023-05-03 PROCEDURE — 20610 DRAIN/INJ JOINT/BURSA W/O US: CPT | Performed by: ORTHOPAEDIC SURGERY

## 2023-05-03 NOTE — TELEPHONE ENCOUNTER
Patient wants to start process for RT knee INJ after success with his other knee today. Please place order for gel Inj to start auth process.

## 2023-05-03 NOTE — PROGRESS NOTES
[facility] Follow-up Progress Note        Chief Complaint:  Chronic left knee pain     History: The patient is a 56year old male who returns for viscosupplement knee injection. The patient is diagnosed with mild osteoarthritis. The patient is here for administration of Monovisc after having undergone the pre-certification and insurance approval process. Physical Exam: On examination there is no apparent swelling or palpable warmth about the affected knee. Mild to moderate crepitus is palpable from near full extension and 110 degrees of flexion. Knee remains stable in all planes with no distal edema. Neurovascular status is intact. Assessment: Diagnoses and all orders for this visit:  There are no diagnoses linked to this encounter. Plan:  The patient presents for viscosupplement knee injection today. Patient understands the nature and risks and gives written consent to proceed. The patient was advised that the benefit may not be noted for 2-4 additional weeks. If there are any adverse reactions I asked that the patient contact us for urgent reassessment. Otherwise, the patient may follow-up as needed. Visco supplement Injection Procedure:  After discussing the risk benefits and alternatives to visco supplement injection including but not limited to needle infection, hypersensitivity reaction or failed improvement, the patient gave verbal consent to proceed. Using meticulous sterile technique I injected 4 cc of 1% Xylocaine at the lateral patellofemoral joint of the left knee for local anesthesia. After attempted aspiration, I injected the entire contents of the Monovisc syringe through an 18-gauge needle to minimal resistance. The patient tolerated this well, a Band-Aid was applied, and instructions were given to contact us with any adverse reactions.      Jennifer Allen MD  [facility]     The dictation was partially prepared using [employer]methods recognition software.   Although every attempt is made to correct errors where identified, discrepancies may still exist.

## 2023-05-12 ENCOUNTER — PATIENT MESSAGE (OUTPATIENT)
Dept: ORTHOPEDICS CLINIC | Facility: CLINIC | Age: 58
End: 2023-05-12

## 2023-05-15 NOTE — TELEPHONE ENCOUNTER
Patient scheduled with Dr. Brandon Robbins for gel inj:    Future Appointments   Date Time Provider [place] Vallecilloi   5/31/2023  2:20 PM Lillie Moore MD [facility] [id number]   6/10/2023 11:00 AM Dileep Murphy MD [facility]

## 2023-05-31 ENCOUNTER — OFFICE VISIT (OUTPATIENT)
Dept: ORTHOPEDICS CLINIC | Facility: CLINIC | Age: 58
End: 2023-05-31
Payer: COMMERCIAL

## 2023-05-31 DIAGNOSIS — M17.11 PRIMARY OSTEOARTHRITIS OF RIGHT KNEE: Primary | ICD-10-CM

## 2023-05-31 PROCEDURE — 20610 DRAIN/INJ JOINT/BURSA W/O US: CPT | Performed by: ORTHOPAEDIC SURGERY

## 2023-05-31 NOTE — PROGRESS NOTES
[facility] Follow-up Progress Note        Chief Complaint: Chronic right knee pain     History: The patient is a 56year old male who returns for viscosupplement knee injection. The patient is diagnosed with osteoarthritis of this right knee. The patient is here for administration of Monovisc after having undergone the pre-certification and insurance approval process. Of note, he relates significant improvement after Monovisc injection to the left knee about a month ago. This motivated him to proceed with right knee injection today. Physical Exam: On examination there is no apparent swelling or palpable warmth about the affected knee. Mild to moderate crepitus is palpable from full extension and 115 degrees of flexion. Knee remains stable in all planes with no distal edema. Neurovascular status is intact. Assessment: Diagnoses and all orders for this visit:  Diagnoses and all orders for this visit:    Primary osteoarthritis of right knee      Plan:  The patient presents for viscosupplement knee injection today. Patient understands the nature and risks and gives written consent to proceed. The patient was advised that the benefit may not be noted for 2-4 additional weeks. If there are any adverse reactions I asked that the patient contact us for urgent reassessment. Otherwise, the patient may follow-up as needed. Visco supplement Injection Procedure:  After discussing the risk benefits and alternatives to visco supplement injection including but not limited to needle infection, hypersensitivity reaction or failed improvement, the patient gave verbal consent to proceed. Using meticulous sterile technique I injected 4 cc of 1% Xylocaine at the lateral patellofemoral joint of the right knee for local anesthesia. After attempted aspiration, I injected the entire contents of the Monovisc syringe through an 18-gauge needle to minimal resistance.   The patient tolerated this well, a Band-Aid was applied, and instructions were given to contact us with any adverse reactions. Beka Mcghee MD  [address] [place] [address] Surgery     The dictation was partially prepared using [employer] voice recognition software.   Although every attempt is made to correct errors where identified, discrepancies may still exist.

## 2023-06-10 ENCOUNTER — OFFICE VISIT (OUTPATIENT)
Dept: FAMILY MEDICINE CLINIC | Facility: CLINIC | Age: 58
End: 2023-06-10
Payer: COMMERCIAL

## 2023-06-10 VITALS
SYSTOLIC BLOOD PRESSURE: 110 MMHG | HEIGHT: 70.67 IN | OXYGEN SATURATION: 98 % | DIASTOLIC BLOOD PRESSURE: 60 MMHG | HEART RATE: 72 BPM | BODY MASS INDEX: 31.78 KG/M2 | WEIGHT: 227 LBS | TEMPERATURE: 98 F

## 2023-06-10 DIAGNOSIS — M25.562 CHRONIC PAIN OF LEFT KNEE: ICD-10-CM

## 2023-06-10 DIAGNOSIS — G89.29 CHRONIC PAIN OF LEFT KNEE: ICD-10-CM

## 2023-06-10 DIAGNOSIS — G89.29 CHRONIC PAIN OF RIGHT KNEE: ICD-10-CM

## 2023-06-10 DIAGNOSIS — Z00.00 ROUTINE GENERAL MEDICAL EXAMINATION AT A HEALTH CARE FACILITY: ICD-10-CM

## 2023-06-10 DIAGNOSIS — M25.561 CHRONIC PAIN OF RIGHT KNEE: ICD-10-CM

## 2023-06-10 DIAGNOSIS — Z12.5 ENCOUNTER FOR SCREENING FOR MALIGNANT NEOPLASM OF PROSTATE: ICD-10-CM

## 2023-06-10 DIAGNOSIS — E11.9 TYPE 2 DIABETES MELLITUS WITHOUT COMPLICATION, WITHOUT LONG-TERM CURRENT USE OF INSULIN (HCC): Primary | ICD-10-CM

## 2023-06-10 DIAGNOSIS — E66.09 CLASS 1 OBESITY DUE TO EXCESS CALORIES WITHOUT SERIOUS COMORBIDITY WITH BODY MASS INDEX (BMI) OF 31.0 TO 31.9 IN ADULT: ICD-10-CM

## 2023-06-10 PROBLEM — E66.811 CLASS 1 OBESITY DUE TO EXCESS CALORIES WITHOUT SERIOUS COMORBIDITY WITH BODY MASS INDEX (BMI) OF 31.0 TO 31.9 IN ADULT: Status: ACTIVE | Noted: 2023-06-10

## 2023-06-10 PROCEDURE — 3008F BODY MASS INDEX DOCD: CPT | Performed by: FAMILY MEDICINE

## 2023-06-10 PROCEDURE — 90471 IMMUNIZATION ADMIN: CPT | Performed by: FAMILY MEDICINE

## 2023-06-10 PROCEDURE — 90677 PCV20 VACCINE IM: CPT | Performed by: FAMILY MEDICINE

## 2023-06-10 PROCEDURE — 3074F SYST BP LT 130 MM HG: CPT | Performed by: FAMILY MEDICINE

## 2023-06-10 PROCEDURE — 99214 OFFICE O/P EST MOD 30 MIN: CPT | Performed by: FAMILY MEDICINE

## 2023-06-10 PROCEDURE — 3078F DIAST BP <80 MM HG: CPT | Performed by: FAMILY MEDICINE

## 2023-06-10 RX ORDER — BLOOD-GLUCOSE METER
KIT MISCELLANEOUS
Qty: 100 STRIP | Refills: 1 | Status: SHIPPED | OUTPATIENT
Start: 2023-06-10

## 2023-06-10 RX ORDER — LANCETS 28 GAUGE
1 EACH MISCELLANEOUS DAILY
Qty: 100 EACH | Refills: 0 | Status: SHIPPED | OUTPATIENT
Start: 2023-06-10 | End: 2024-06-09

## 2023-06-10 NOTE — PATIENT INSTRUCTIONS
Try to cut out soda     Increase exercise like you are planning    Call to schedule eye exam    Plan to check sugars 1-2x/month    Followup in 3 months

## 2023-06-12 ENCOUNTER — TELEPHONE (OUTPATIENT)
Dept: FAMILY MEDICINE CLINIC | Facility: CLINIC | Age: 58
End: 2023-06-12

## 2023-06-12 DIAGNOSIS — Z00.00 ROUTINE GENERAL MEDICAL EXAMINATION AT HEALTH CARE FACILITY: ICD-10-CM

## 2023-06-12 DIAGNOSIS — Z12.5 ENCOUNTER FOR SCREENING FOR MALIGNANT NEOPLASM OF PROSTATE: Primary | ICD-10-CM

## 2023-06-12 NOTE — TELEPHONE ENCOUNTER
Patient was here on 06/10/2023, patient had labs done in office. Labs were not picked up. Patient needs to go to lab for fasting labs. LVM for patient to call office.

## 2023-06-14 NOTE — PROGRESS NOTES
Patient is due for wellness visit. Left message for patient to call office. Otolaryngologist Procedure Text (A): After obtaining clear surgical margins the patient was sent to otolaryngology for surgical repair.  The patient understands they will receive post-surgical care and follow-up from the referring physician's office.

## 2023-06-15 ENCOUNTER — HOSPITAL ENCOUNTER (OUTPATIENT)
Age: 58
Discharge: HOME | End: 2023-06-15
Payer: COMMERCIAL

## 2023-06-15 ENCOUNTER — TELEPHONE (OUTPATIENT)
Dept: FAMILY MEDICINE CLINIC | Facility: CLINIC | Age: 58
End: 2023-06-15

## 2023-06-15 VITALS
OXYGEN SATURATION: 99 % | TEMPERATURE: 97 F | BODY MASS INDEX: 32.5 KG/M2 | WEIGHT: 227 LBS | SYSTOLIC BLOOD PRESSURE: 145 MMHG | HEIGHT: 70 IN | HEART RATE: 65 BPM | RESPIRATION RATE: 18 BRPM | DIASTOLIC BLOOD PRESSURE: 80 MMHG

## 2023-06-15 DIAGNOSIS — L02.419 AXILLARY ABSCESS: Primary | ICD-10-CM

## 2023-06-15 RX ORDER — SULFAMETHOXAZOLE AND TRIMETHOPRIM 800; 160 MG/1; MG/1
1 TABLET ORAL 2 TIMES DAILY
Qty: 14 TABLET | Refills: 0 | Status: SHIPPED | OUTPATIENT
Start: 2023-06-15 | End: 2023-06-22

## 2023-06-15 NOTE — TELEPHONE ENCOUNTER
Pt called and stated that he went to an immediate care today due to a lump under his left under arm. Pt stated he had discomfort but didn't notice the lump until this morning. Pt stated that immediate care informed him to make an apt with his primary to discuss.  does not have any openings until end of July.

## 2023-06-15 NOTE — TELEPHONE ENCOUNTER
Spoke to pt and he said it feels large. Feels like he is \"carrying a pillow\" under his arm. Feels soft he said. appt scheduled for 6/21 with Dr Linda Rodriguez.

## 2023-06-20 ENCOUNTER — OFFICE VISIT (OUTPATIENT)
Dept: FAMILY MEDICINE CLINIC | Facility: CLINIC | Age: 58
End: 2023-06-20
Payer: COMMERCIAL

## 2023-06-20 VITALS
HEIGHT: 70.47 IN | BODY MASS INDEX: 31.57 KG/M2 | OXYGEN SATURATION: 96 % | TEMPERATURE: 98 F | DIASTOLIC BLOOD PRESSURE: 60 MMHG | HEART RATE: 74 BPM | SYSTOLIC BLOOD PRESSURE: 110 MMHG | WEIGHT: 223 LBS

## 2023-06-20 DIAGNOSIS — Z00.00 ROUTINE GENERAL MEDICAL EXAMINATION AT HEALTH CARE FACILITY: ICD-10-CM

## 2023-06-20 DIAGNOSIS — L02.419 AXILLARY ABSCESS: Primary | ICD-10-CM

## 2023-06-20 DIAGNOSIS — E11.9 TYPE 2 DIABETES MELLITUS WITHOUT COMPLICATION, WITHOUT LONG-TERM CURRENT USE OF INSULIN (HCC): ICD-10-CM

## 2023-06-20 DIAGNOSIS — Z12.5 ENCOUNTER FOR SCREENING FOR MALIGNANT NEOPLASM OF PROSTATE: ICD-10-CM

## 2023-06-20 LAB
ALBUMIN SERPL-MCNC: 4.1 G/DL (ref 3.4–5)
ALBUMIN/GLOB SERPL: 1.1 {RATIO} (ref 1–2)
ALP LIVER SERPL-CCNC: 65 U/L
ALT SERPL-CCNC: 17 U/L
ANION GAP SERPL CALC-SCNC: 2 MMOL/L (ref 0–18)
AST SERPL-CCNC: 19 U/L (ref 15–37)
BASOPHILS # BLD AUTO: 0.04 X10(3) UL (ref 0–0.2)
BASOPHILS NFR BLD AUTO: 0.9 %
BILIRUB SERPL-MCNC: 0.4 MG/DL (ref 0.1–2)
BUN BLD-MCNC: 15 MG/DL (ref 7–18)
CALCIUM BLD-MCNC: 9.1 MG/DL (ref 8.5–10.1)
CHLORIDE SERPL-SCNC: 110 MMOL/L (ref 98–112)
CHOLEST SERPL-MCNC: 161 MG/DL (ref ?–200)
CO2 SERPL-SCNC: 23 MMOL/L (ref 21–32)
COMPLEXED PSA SERPL-MCNC: 0.45 NG/ML (ref ?–4)
CREAT BLD-MCNC: 1.48 MG/DL
EOSINOPHIL # BLD AUTO: 0.1 X10(3) UL (ref 0–0.7)
EOSINOPHIL NFR BLD AUTO: 2.3 %
ERYTHROCYTE [DISTWIDTH] IN BLOOD BY AUTOMATED COUNT: 15.1 %
FASTING PATIENT LIPID ANSWER: YES
FASTING STATUS PATIENT QL REPORTED: YES
GFR SERPLBLD BASED ON 1.73 SQ M-ARVRAT: 56 ML/MIN/1.73M2 (ref 60–?)
GLOBULIN PLAS-MCNC: 3.6 G/DL (ref 2.8–4.4)
GLUCOSE BLD-MCNC: 93 MG/DL (ref 70–99)
HCT VFR BLD AUTO: 42.2 %
HDLC SERPL-MCNC: 40 MG/DL (ref 40–59)
HGB BLD-MCNC: 13.3 G/DL
IMM GRANULOCYTES # BLD AUTO: 0.01 X10(3) UL (ref 0–1)
IMM GRANULOCYTES NFR BLD: 0.2 %
LDLC SERPL CALC-MCNC: 110 MG/DL (ref ?–100)
LYMPHOCYTES # BLD AUTO: 1.56 X10(3) UL (ref 1–4)
LYMPHOCYTES NFR BLD AUTO: 36.6 %
MCH RBC QN AUTO: 23.8 PG (ref 26–34)
MCHC RBC AUTO-ENTMCNC: 31.5 G/DL (ref 31–37)
MCV RBC AUTO: 75.6 FL
MONOCYTES # BLD AUTO: 0.48 X10(3) UL (ref 0.1–1)
MONOCYTES NFR BLD AUTO: 11.3 %
NEUTROPHILS # BLD AUTO: 2.07 X10 (3) UL (ref 1.5–7.7)
NEUTROPHILS # BLD AUTO: 2.07 X10(3) UL (ref 1.5–7.7)
NEUTROPHILS NFR BLD AUTO: 48.7 %
NONHDLC SERPL-MCNC: 121 MG/DL (ref ?–130)
OSMOLALITY SERPL CALC.SUM OF ELEC: 281 MOSM/KG (ref 275–295)
PLATELET # BLD AUTO: 253 10(3)UL (ref 150–450)
POTASSIUM SERPL-SCNC: 4.2 MMOL/L (ref 3.5–5.1)
PROT SERPL-MCNC: 7.7 G/DL (ref 6.4–8.2)
RBC # BLD AUTO: 5.58 X10(6)UL
SODIUM SERPL-SCNC: 135 MMOL/L (ref 136–145)
TRIGL SERPL-MCNC: 57 MG/DL (ref 30–149)
TSI SER-ACNC: 1.63 MIU/ML (ref 0.36–3.74)
VLDLC SERPL CALC-MCNC: 10 MG/DL (ref 0–30)
WBC # BLD AUTO: 4.3 X10(3) UL (ref 4–11)

## 2023-06-20 PROCEDURE — 80061 LIPID PANEL: CPT | Performed by: FAMILY MEDICINE

## 2023-06-20 PROCEDURE — 84153 ASSAY OF PSA TOTAL: CPT | Performed by: FAMILY MEDICINE

## 2023-06-20 PROCEDURE — 80050 GENERAL HEALTH PANEL: CPT | Performed by: FAMILY MEDICINE

## 2023-06-20 NOTE — PATIENT INSTRUCTIONS
Complete antibiotics as directed    Warm compress 2x/day    Bath with epsom salts if able to     Call to schedule with surgeon in 1-2 weeks    Touch base sooner if needed    Don't forget to schedule fasting bloodwork and call to schedule eye exam    Followup with me in Sept as planned

## 2023-07-05 ENCOUNTER — TELEPHONE (OUTPATIENT)
Dept: FAMILY MEDICINE CLINIC | Facility: CLINIC | Age: 58
End: 2023-07-05

## 2023-07-05 NOTE — TELEPHONE ENCOUNTER
Pt called and is requesting a refill on med sulfamethoxazole-trimethoprim. States he got it at an immediate care and spoke to  about it at his last apt.

## 2023-07-05 NOTE — TELEPHONE ENCOUNTER
Please advise if you are willing to refill patient's bactrim or he needs to follow up with gen surg first as advised in 6/20/23 office visit notes.

## 2023-07-06 RX ORDER — SULFAMETHOXAZOLE AND TRIMETHOPRIM 800; 160 MG/1; MG/1
1 TABLET ORAL 2 TIMES DAILY
Qty: 14 TABLET | Refills: 0 | Status: SHIPPED | OUTPATIENT
Start: 2023-07-06

## 2023-07-06 NOTE — TELEPHONE ENCOUNTER
Bactrim refilled but he also needs to f/u with gen surg regarding the continued abscess    Referral placed at last appt    Thanks    Provider Address Phone   [facility] [address]  [place] [address] [phone]

## 2023-07-10 NOTE — TELEPHONE ENCOUNTER
Left msg for pt with instructions and asked him to also please contact general surgery to make that appt

## 2023-07-30 NOTE — ADDENDUM NOTE
Addended by: Odette Lanes on: 2/22/2021 11:48 AM     Modules accepted: Orders
Addended by: Odette Lanes on: 4/6/2021 02:11 PM     Modules accepted: Orders
Addended by: Yeison Rausch on: 2/24/2021 03:47 PM     Modules accepted: Orders
Attending Attestation (For Attendings USE Only)...

## 2024-03-06 NOTE — ED INITIAL ASSESSMENT (HPI)
C/O left knee pain that started 2 weeks ago when he had slipped in the shower. Pain worse when in a certain position for a prolonged period. Swelling has been intermittent. 2 seconds or less

## 2024-05-23 ENCOUNTER — MED REC SCAN ONLY (OUTPATIENT)
Dept: FAMILY MEDICINE CLINIC | Facility: CLINIC | Age: 59
End: 2024-05-23

## 2024-10-16 ENCOUNTER — TELEPHONE (OUTPATIENT)
Dept: FAMILY MEDICINE CLINIC | Facility: CLINIC | Age: 59
End: 2024-10-16

## 2024-10-16 NOTE — TELEPHONE ENCOUNTER
Left voicemail/sent MyChart/letter reminding patient due for annual wellness physical with Dr. Tyrone Boyle.  Informed to return call at [phone] or schedule through [employer].

## 2025-06-17 ENCOUNTER — HOSPITAL ENCOUNTER (OUTPATIENT)
Age: 60
Discharge: HOME | End: 2025-06-17
Attending: EMERGENCY MEDICINE
Payer: COMMERCIAL

## 2025-06-17 ENCOUNTER — APPOINTMENT (OUTPATIENT)
Dept: GENERAL RADIOLOGY | Age: 60
End: 2025-06-17
Attending: EMERGENCY MEDICINE
Payer: COMMERCIAL

## 2025-06-17 VITALS
TEMPERATURE: 98 F | DIASTOLIC BLOOD PRESSURE: 106 MMHG | OXYGEN SATURATION: 98 % | HEART RATE: 81 BPM | RESPIRATION RATE: 18 BRPM | SYSTOLIC BLOOD PRESSURE: 137 MMHG

## 2025-06-17 DIAGNOSIS — M79.675 PAIN OF TOE OF LEFT FOOT: Primary | ICD-10-CM

## 2025-06-17 PROCEDURE — 99213 OFFICE O/P EST LOW 20 MIN: CPT

## 2025-06-17 PROCEDURE — 99214 OFFICE O/P EST MOD 30 MIN: CPT

## 2025-06-17 PROCEDURE — 73660 X-RAY EXAM OF TOE(S): CPT | Performed by: EMERGENCY MEDICINE

## 2025-06-17 RX ORDER — MUPIROCIN 2 %
1 OINTMENT (GRAM) TOPICAL 3 TIMES DAILY
Qty: 1 EACH | Refills: 0 | Status: SHIPPED | OUTPATIENT
Start: 2025-06-17 | End: 2025-07-01

## 2025-06-17 NOTE — ED PROVIDER NOTES
Patient Seen in: [facility]        History  No chief complaint on file.    Stated Complaint: Left toe pain    Subjective:   HPI    Patient is a 59-year-old male who states that for the past several months he is having some discomfort lateral aspect of his left fifth toe.  Patient states he may have stubbed the toe several times over that period.  Patient states it was more painful to walk on it last day or so.  Patient denies fevers or chills.  Remainder of review of systems negative.      Objective:     No pertinent past medical history.            No pertinent past surgical history.              No pertinent social history.            Review of Systems    Positive for stated complaint: Left toe pain  Other systems are as noted in HPI.  Constitutional and vital signs reviewed.      All other systems reviewed and negative except as noted above.                  Physical Exam    ED Triage Vitals [06/17/25 1020]   BP (!) 137/106   Pulse 81   Resp 18   Temp 98.1 °F (36.7 °C)   Temp src    SpO2 98 %   O2 Device None (Room air)       Current Vitals:   Vital Signs  BP: (!) 137/106  Pulse: 81  Resp: 18  Temp: 98.1 °F (36.7 °C)    Oxygen Therapy  SpO2: 98 %  O2 Device: None (Room air)            Physical Exam   GENERAL: Patient resting comfortably on the cart in no acute distress.  HEENT: Extraocular muscles intact,   EXTREMITIES: Left lower extremity.  Patient has good dorsalis pedis pulse.  Patient is slight erythema swelling lateral aspect of the fifth toe no definite fluctuance.  No definite warmth.  Appears to be a callus on the lateral aspect of the toe.  SKIN: No rash, good turgor.  NEURO: Patient answers questions appropriately.  No focal deficits appreciated.            ED Course  Labs Reviewed - No data to display       Left foot x-ray No acute disease.    Independent reviewed by myself, no fracture                  MDM     Patient's x-ray is negative.  Patient slight swelling erythema lateral  aspect of the toe will be treated with Bactroban.  Recommend follow-up for evaluation podiatrist as discussed.  Monitor for further evaluation.  Return if new or worse symptoms.  I did consider fracture, cellulitis, contusion.        Medical Decision Making      Disposition and Plan     Clinical Impression:  1. Pain of toe of left foot         Disposition:  Discharge  6/17/2025 11:49 am    Follow-up:  Tyrone Boyle MD  [address] [address] [address] [phone]    In 2 days      Tobi Duggan DPM  [address] [place] [address] [id number] [phone]    Call in 2 days            Medications Prescribed:  Discharge Medication List as of 6/17/2025 12:00 PM        START taking these medications    Details   mupirocin 2 % External Ointment Apply 1 Application topically 3 (three) times daily for 14 days., Normal, Disp-1 each, R-0                   Supplementary Documentation:

## 2025-06-17 NOTE — DISCHARGE INSTRUCTIONS
Follow-up for further evaluation primary physician and podiatry as discussed.  Call for appointment.  Return if new or worse symptoms.  Bactroban as prescribed.  Ibuprofen as needed.  Activity as tolerated.    To schedule an appointment with the Orthopedic and Sports Medicine department; please text or call [phone] and choose option 3 when prompted. If your insurance requires a referral, please contact your primary care provider first.

## 2025-06-19 ENCOUNTER — TELEPHONE (OUTPATIENT)
Dept: FAMILY MEDICINE CLINIC | Facility: CLINIC | Age: 60
End: 2025-06-19

## 2025-06-19 DIAGNOSIS — M79.673 PAIN OF FOOT, UNSPECIFIED LATERALITY: Primary | ICD-10-CM

## 2025-06-19 NOTE — TELEPHONE ENCOUNTER
Per IC notes Xray negative and given Mupirocin to use. Referred to foot and ankle by IC. Would you like to see patient soon or referral to foot and ankle?

## 2025-06-19 NOTE — TELEPHONE ENCOUNTER
Called and spoke with patient. Gave referral information via [facility] per patient request.

## 2025-06-19 NOTE — TELEPHONE ENCOUNTER
Patient appointment was rescheduled due to scheduling error. Patient states he has an ongoing issue with (L ) toe, bump on side, hard to walk. Patient was seen at  6/17

## 2025-07-01 ENCOUNTER — OFFICE VISIT (OUTPATIENT)
Dept: FAMILY MEDICINE CLINIC | Facility: CLINIC | Age: 60
End: 2025-07-01
Payer: COMMERCIAL

## 2025-07-01 VITALS
HEART RATE: 65 BPM | RESPIRATION RATE: 18 BRPM | TEMPERATURE: 98 F | WEIGHT: 225 LBS | BODY MASS INDEX: 31.5 KG/M2 | SYSTOLIC BLOOD PRESSURE: 134 MMHG | HEIGHT: 71 IN | OXYGEN SATURATION: 97 % | DIASTOLIC BLOOD PRESSURE: 80 MMHG

## 2025-07-01 DIAGNOSIS — E11.9 TYPE 2 DIABETES MELLITUS WITHOUT COMPLICATION, WITHOUT LONG-TERM CURRENT USE OF INSULIN (HCC): ICD-10-CM

## 2025-07-01 DIAGNOSIS — F33.1 MODERATE EPISODE OF RECURRENT MAJOR DEPRESSIVE DISORDER (HCC): ICD-10-CM

## 2025-07-01 DIAGNOSIS — F41.9 ANXIETY: ICD-10-CM

## 2025-07-01 DIAGNOSIS — Z59.10 INADEQUATE HOUSING, UNSPECIFIED: ICD-10-CM

## 2025-07-01 DIAGNOSIS — Z00.00 ROUTINE GENERAL MEDICAL EXAMINATION AT A HEALTH CARE FACILITY: Primary | ICD-10-CM

## 2025-07-01 DIAGNOSIS — Z59.41 FOOD INSECURITY: ICD-10-CM

## 2025-07-01 DIAGNOSIS — Z12.5 ENCOUNTER FOR SCREENING FOR MALIGNANT NEOPLASM OF PROSTATE: ICD-10-CM

## 2025-07-01 LAB
CREAT UR-SCNC: 204.9 MG/DL
HEMOGLOBIN A1C: 12.6 % (ref 4.3–5.6)
MICROALBUMIN UR-MCNC: 0.4 MG/DL
MICROALBUMIN/CREAT 24H UR-RTO: 2 UG/MG (ref ?–30)

## 2025-07-01 PROCEDURE — 82570 ASSAY OF URINE CREATININE: CPT | Performed by: FAMILY MEDICINE

## 2025-07-01 PROCEDURE — 82043 UR ALBUMIN QUANTITATIVE: CPT | Performed by: FAMILY MEDICINE

## 2025-07-01 RX ORDER — METFORMIN HYDROCHLORIDE 500 MG/1
500 TABLET, EXTENDED RELEASE ORAL
COMMUNITY
End: 2025-07-01

## 2025-07-01 RX ORDER — ESCITALOPRAM OXALATE 5 MG/1
5 TABLET ORAL DAILY
Qty: 30 TABLET | Refills: 2 | Status: SHIPPED | OUTPATIENT
Start: 2025-07-01

## 2025-07-01 RX ORDER — METFORMIN HYDROCHLORIDE 500 MG/1
TABLET, EXTENDED RELEASE ORAL
Qty: 180 TABLET | Refills: 1 | Status: SHIPPED | OUTPATIENT
Start: 2025-07-01 | End: 2025-10-15

## 2025-07-01 SDOH — ECONOMIC STABILITY - HOUSING INSECURITY: INADEQUATE HOUSING UNSPECIFIED: Z59.10

## 2025-07-01 SDOH — ECONOMIC STABILITY - FOOD INSECURITY: FOOD INSECURITY: Z59.41

## 2025-07-01 NOTE — PATIENT INSTRUCTIONS
Restart metformin    Start escitalopram as prescribed    Really work on diet changes     Increase exercise      will call with counseling options    Followup in 4 wks

## 2025-07-09 ENCOUNTER — TELEPHONE (OUTPATIENT)
Age: 60
End: 2025-07-09

## 2025-07-09 NOTE — TELEPHONE ENCOUNTER
Hello,    Sorry I missed you - I am reaching out from the [facility] Navigation department, following up on an order from your provider's office to assist in connecting you with resources for care. If you would like to discuss this further, please give us a call back at [phone], or for more immediate assistance you can contact our 24-hour help line at [phone]. We look forward to hearing from you soon.

## 2025-07-10 NOTE — PROGRESS NOTES
Patrick Silva is a 59 year old male who is here for   Chief Complaint   Patient presents with    Wellness Visit     Reviewed Preventative/Wellness form with patient.    Diabetes       HPI:     1. Routine general medical examination at a health care facility  2. Encounter for screening for malignant neoplasm of prostate  -due for wellness    3. Type 2 diabetes mellitus without complication, without long-term current use of insulin (HCC)  --Last A1c value was 12.6% done 7/1/2025.    -last eye exam: Last Diabetic Eye Exam:  No data recorded  No data recorded    -denies hyper or hypo glycemic symptoms     4. Anxiety  5. 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  Counseling given: Not Answered      History   Alcohol Use Never       History   Drug Use Unknown         Pertinent Fam Hx:    Family History   Problem Relation Age of Onset    Diabetes Maternal Grandmother     Heart Attack Maternal Grandmother     Stroke Maternal Grandmother     Diabetes Paternal Grandfather        Social History     Socioeconomic History    Marital status:    Tobacco Use    Smoking status: Never    Smokeless tobacco: Never   Vaping Use    Vaping status: Never Used   Substance and Sexual Activity    Alcohol use: Never    Drug use:  Never   Other Topics Concern    Caffeine Concern Yes     Comment: 1 can of soda daily and 1 cup of coffee daily    Weight Concern Yes    Exercise Yes     Comment: 2 x weekly    Seat Belt Yes       Wt Readings from Last 6 Encounters:   07/01/25 225 lb (102.1 kg)   06/20/23 223 lb (101.2 kg)   06/15/23 227 lb (103 kg)   06/10/23 227 lb (103 kg)   04/10/23 234 lb (106.1 kg)   04/10/23 230 lb (104.3 kg)       Patient Active Problem List   Diagnosis    Special screening for malignant neoplasms, colon    Colon polyp    Chronic pain of left knee    Class 1 obesity due to excess calories without serious comorbidity with body mass index (BMI) of 31.0 to 31.9 in adult    Type 2 diabetes mellitus without complication, without long-term current use of insulin ([facility])       Current Outpatient Medications on File Prior to Visit   Medication Sig Dispense Refill    sulfamethoxazole-trimethoprim -160 MG Oral Tab per tablet Take 1 tablet by mouth 2 (two) times daily. (Patient not taking: Reported on 7/1/2025) 14 tablet 0    Glucose Blood (FREESTYLE LITE TEST) In Vitro Strip Use as directed once daily. Dx: 11.65 (Patient not taking: Reported on 7/1/2025) 100 strip 1     No current facility-administered medications on file prior to visit.       REVIEW OF SYSTEMS:     See HPI for relevant ROS  GENERAL HEALTH: no other complaints  NEURO: no other complaints  VISION: no other complaints  RESPIRATORY: no other complaints  CARDIOVASCULAR: no other complaints  GI: no other complaints  : no other complaints  SKIN: no other complaints  PSYCH: no other complaints  EXT: no other complaints    Wt Readings from Last 6 Encounters:   07/01/25 225 lb (102.1 kg)   06/20/23 223 lb (101.2 kg)   06/15/23 227 lb (103 kg)   06/10/23 227 lb (103 kg)   04/10/23 234 lb (106.1 kg)   04/10/23 230 lb (104.3 kg)       No Known Allergies    Patient Active Problem List   Diagnosis    Special screening for malignant neoplasms, colon    Colon polyp    Chronic  pain of left knee    Class 1 obesity due to excess calories without serious comorbidity with body mass index (BMI) of 31.0 to 31.9 in adult    Type 2 diabetes mellitus without complication, without long-term current use of insulin (HCC)       Family History   Problem Relation Age of Onset    Diabetes Maternal Grandmother     Heart Attack Maternal Grandmother     Stroke Maternal Grandmother     Diabetes Paternal Grandfather       Past Medical History:    Heart palpitations    Prediabetes    Stress    Visual impairment    glasses    Wears glasses      Past Surgical History:   Procedure Laterality Date    Arthroscopy of joint unlisted Bilateral     Repair rotator cuff,chronic Left       Social History:    Social History     Socioeconomic History    Marital status:    Tobacco Use    Smoking status: Never    Smokeless tobacco: Never   Vaping Use    Vaping status: Never Used   Substance and Sexual Activity    Alcohol use: Never    Drug use: Never   Other Topics Concern    Caffeine Concern Yes     Comment: 1 can of soda daily and 1 cup of coffee daily    Weight Concern Yes    Exercise Yes     Comment: 2 x weekly    Seat Belt Yes     Social Drivers of Health     Food Insecurity: Food Insecurity Present (7/1/2025)    NCSS - Food Insecurity     Worried About Running Out of Food in the Last Year: Yes     Ran Out of Food in the Last Year: Yes   Transportation Needs: No Transportation Needs (7/1/2025)    NCSS - Transportation     Lack of Transportation: No   Housing Stability: At Risk (7/1/2025)    NCSS - Housing/Utilities     Has Housing: Yes     Worried About Losing Housing: Yes     Unable to Get Utilities: No           EXAM:   /80 (BP Location: Left arm, Patient Position: Sitting, Cuff Size: adult)   Pulse 65   Temp 97.7 °F (36.5 °C) (Temporal)   Resp 18   Ht 5' 11\" (1.803 m)   Wt 225 lb (102.1 kg)   SpO2 97%   BMI 31.38 kg/m²     GENERAL: A&O, in no apparent distress  HEENT: atraumatic, MMM, throat is  clear  EYES: PERRLA, EOMI  NECK: supple, no thyromegaly  CHEST: no tenderness  LUNGS: clear to auscultation bilateraly, no c/w/r  CARDIO: RRR without murmurs  GI: soft, non-tender, non-distended, no appreciable hsm, bs throughout  NEURO: CN II-XII grossly intact  PSYCH: pleasant  MUSCULOSKELETAL: normal gait, no appreciable defects  EXTREMITIES: no cyanosis, clubbing or edema  SKIN: no rashes,no suspicious lesions    Problem focused exam (for problems outside of physical, if any):    Bilateral barefoot skin diabetic exam is normal, visualized feet and the appearance is normal.  Bilateral monofilament/sensation of both feet is normal.  Pulsation pedal pulse exam of both lower legs/feet is normal as well.      The 10-year ASCVD risk score (Ervin MATHEW, et al., 2019) is: 14.5%    Values used to calculate the score:      Age: 59 years      Sex: Male      Is Non- : Yes      Diabetic: Yes      Tobacco smoker: No      Systolic Blood Pressure: 134 mmHg      Is BP treated: No      HDL Cholesterol: 40 mg/dL      Total Cholesterol: 161 mg/dL    ASSESSMENT AND PLAN:     Health Maintenance  -We discussed the following:  Healthy diet and exercise, immunizations, and cancer screening    -Fasting labs ordered    1. Routine general medical examination at a health care facility  - MICROALB/CREAT RATIO, RANDOM URINE; Future  - HEMOGLOBIN A1C  - CBC WITH DIFFERENTIAL WITH PLATELET; Future  - COMP METABOLIC PANEL (14); Future  - TSH W REFLEX TO FREE T4; Future  - LIPID PANEL; Future    2. Encounter for screening for malignant neoplasm of prostate  - PSA TOTAL, SCREEN; Future    3. Type 2 diabetes mellitus without complication, without long-term current use of insulin (HCC)  -check labs  -foot exam normal  -referred to - OPHTHALMOLOGY - INTERNAL  -start metformin  -counseled at length regarding diet changes  -f/u in 2 months  -chcek sugars at home    4. Chronic pain of left knee  -c/w exercises and activity    5.  History of DVT (deep vein thrombosis)  -DVT was previously provoked  -f/u prn    6. Depression/Anxiety  -counseled at length  -consider counseling - referral placed  -start lexapro 5mg  -risks and side effects of med discussed, patient expressed understanding  -f/u in 2 months    Chronic Conditions:    No problem-specific Assessment & Plan notes found for this encounter.       Immunizations:  There are no preventive care reminders to display for this patient.     Health Maintenance:  Annual Physical Never done  Diabetes Care Dilated Eye Exam Never done  Diabetes Care: Microalb/Creat Ratio Never done  Pneumococcal Vaccine: Birth to 64yrs(1 - PCV) Never done  Zoster Vaccines(1 of 2) Never done  COVID-19 Vaccine(3 - Booster for Moderna series) due on 07/20/2021  LDL Control due on 02/19/2022  Diabetes Care: GFR due on 11/20/2022  Annual Depression Screening due on 01/01/2023  PSA due on 02/19/2023  Influenza Vaccine(Season Ended) due on 10/01/2023  Diabetes Care A1C due on 10/10/2023  Colorectal Cancer Screening due on 02/04/2025  DTaP,Tdap,and Td Vaccines(3 - Td or Tdap) due on 01/26/2033    Orders This Visit:  Orders Placed This Encounter   Procedures    POC Hemoglobin A1C    Microalb/Creat Ratio, Random Urine    PSA Total, Screen    TSH W Reflex To Free T4    Lipid Panel    Comp Metabolic Panel (14)    CBC With Differential With Platelet       Meds This Visit:  Requested Prescriptions     Signed Prescriptions Disp Refills    metFORMIN  MG Oral Tablet 24 Hr 180 tablet 1     Sig: Take 1 tablet (500 mg total) by mouth daily with breakfast for 7 days, THEN 2 tablets (1,000 mg total) daily with breakfast.    escitalopram 5 MG Oral Tab 30 tablet 2     Sig: Take 1 tablet (5 mg total) by mouth daily.       Imaging & Referrals:   NAVIGATOR     The patient indicates understanding of these issues and agrees to the plan.  The patient is asked to return in 2 months, sooner prn.  COLLEEN AVELAR MD    I spent a total of  30 minutes, more than half of which was spent counseling/coordinating care regarding dm, anxiety/depression , hx dvt (outside of time for wellness)

## 2025-07-24 ENCOUNTER — TELEPHONE (OUTPATIENT)
Age: 60
End: 2025-07-24

## 2025-08-05 ENCOUNTER — MED REC SCAN ONLY (OUTPATIENT)
Dept: FAMILY MEDICINE CLINIC | Facility: CLINIC | Age: 60
End: 2025-08-05

## 2025-08-05 ENCOUNTER — TELEPHONE (OUTPATIENT)
Dept: FAMILY MEDICINE CLINIC | Facility: CLINIC | Age: 60
End: 2025-08-05

## 2025-08-05 ENCOUNTER — OFFICE VISIT (OUTPATIENT)
Dept: FAMILY MEDICINE CLINIC | Facility: CLINIC | Age: 60
End: 2025-08-05

## 2025-08-05 VITALS
TEMPERATURE: 98 F | DIASTOLIC BLOOD PRESSURE: 82 MMHG | BODY MASS INDEX: 32.09 KG/M2 | RESPIRATION RATE: 18 BRPM | OXYGEN SATURATION: 96 % | SYSTOLIC BLOOD PRESSURE: 129 MMHG | HEIGHT: 71 IN | HEART RATE: 86 BPM | WEIGHT: 229.19 LBS

## 2025-08-05 DIAGNOSIS — E11.9 TYPE 2 DIABETES MELLITUS WITHOUT COMPLICATION, WITHOUT LONG-TERM CURRENT USE OF INSULIN (HCC): Primary | ICD-10-CM

## 2025-08-05 DIAGNOSIS — Z12.11 ENCOUNTER FOR SCREENING FOR MALIGNANT NEOPLASM OF COLON: ICD-10-CM

## 2025-08-05 DIAGNOSIS — F41.9 ANXIETY: ICD-10-CM

## 2025-08-05 DIAGNOSIS — F33.1 MODERATE EPISODE OF RECURRENT MAJOR DEPRESSIVE DISORDER (HCC): ICD-10-CM

## 2025-08-05 PROCEDURE — G2211 COMPLEX E/M VISIT ADD ON: HCPCS | Performed by: FAMILY MEDICINE

## 2025-08-05 PROCEDURE — 3074F SYST BP LT 130 MM HG: CPT | Performed by: FAMILY MEDICINE

## 2025-08-05 PROCEDURE — 99214 OFFICE O/P EST MOD 30 MIN: CPT | Performed by: FAMILY MEDICINE

## 2025-08-05 PROCEDURE — 3008F BODY MASS INDEX DOCD: CPT | Performed by: FAMILY MEDICINE

## 2025-08-05 PROCEDURE — 3079F DIAST BP 80-89 MM HG: CPT | Performed by: FAMILY MEDICINE

## 2025-08-06 RX ORDER — BLOOD-GLUCOSE METER
1 KIT MISCELLANEOUS DAILY
Qty: 1 KIT | Refills: 0 | Status: SHIPPED | OUTPATIENT
Start: 2025-08-06 | End: 2026-08-06

## 2025-08-06 RX ORDER — BLOOD-GLUCOSE METER
KIT MISCELLANEOUS
Qty: 100 STRIP | Refills: 0 | Status: SHIPPED | OUTPATIENT
Start: 2025-08-06

## 2025-08-06 RX ORDER — LANCETS 28 GAUGE
1 EACH MISCELLANEOUS DAILY
Qty: 100 EACH | Refills: 1 | Status: SHIPPED | OUTPATIENT
Start: 2025-08-06 | End: 2026-08-06

## (undated) DEVICE — NON-ADHERENT STRIPS,OIL EMULSION: Brand: CURITY

## (undated) DEVICE — STERILE POLYISOPRENE POWDER-FREE SURGICAL GLOVES WITH EMOLLIENT COATING: Brand: PROTEXIS

## (undated) DEVICE — AGGRESSIVE PLUS, ANGLED CUTTER: Brand: FORMULA

## (undated) DEVICE — CHLORAPREP 26ML APPLICATOR

## (undated) DEVICE — SCD SLEEVE KNEE HI BLEND

## (undated) DEVICE — DISPOSABLE TOURNIQUET CUFF SINGLE BLADDER, DUAL PORT AND QUICK CONNECT CONNECTOR: Brand: COLOR CUFF

## (undated) DEVICE — COVER,MAYO STAND,STERILE: Brand: MEDLINE

## (undated) DEVICE — SUPER SPONGES,MEDIUM: Brand: KERLIX

## (undated) DEVICE — PADDING CAST COTTON  4

## (undated) DEVICE — 10K/24K ARTHROSCOPY INFLOW TUBE SET: Brand: 10K/24K

## (undated) DEVICE — GOWN SURG AERO CHROME XXL

## (undated) DEVICE — SHEET,DRAPE,70X100,STERILE: Brand: MEDLINE

## (undated) DEVICE — MEDI-VAC NON-CONDUCTIVE SUCTION TUBING: Brand: CARDINAL HEALTH

## (undated) DEVICE — SOL LACT RINGERS 3000ML

## (undated) DEVICE — [AGGRESSIVE PLUS CUTTER, ARTHROSCOPIC SHAVER BLADE,  DO NOT RESTERILIZE,  DO NOT USE IF PACKAGE IS DAMAGED,  KEEP DRY,  KEEP AWAY FROM SUNLIGHT]: Brand: FORMULA

## (undated) DEVICE — STERILE POLYISOPRENE POWDER-FREE SURGICAL GLOVES: Brand: PROTEXIS

## (undated) DEVICE — SUTURE ETHILON 3-0 PS-2

## (undated) DEVICE — KNEE ARTHROSCOPY CDS: Brand: MEDLINE INDUSTRIES, INC.

## (undated) DEVICE — PADDING CAST COTTON STER 6

## (undated) NOTE — LETTER
10/20/21  Post-operative Arthroscopy    Medication: Before you leave the hospital, you will be given a prescription for a pain reliever. This medication contains a narcotic with acetaminophen (Tylenol), so do not take any additional Tylenol.     You may katlin out. Your plan for physical therapy, driving, and returning to work will be discussed at this appointment. Please don't hesitate to contact our office at [phone] if you have any post-operative questions or concerns.

## (undated) NOTE — LETTER
Date: 2/17/2022    Patient Name: Etienne Mackay          To Whom it may concern: This letter has been written at the patient's request. The above patient was seen at one of the [facility] locations for treatment of a medical condition. The patient may return to work with the following restrictions: no squatting, no ladders, no climbing, no lifting or carrying more than 20 lbs push pull and carry.        Sincerely,    Saad Hanson MD

## (undated) NOTE — LETTER
Date & Time: 1/26/2023, 7:42 PM  Patient: Paul Prado  Encounter Provider(s):    Park Moreno PA-C       To Whom It May Concern:    Mara Cedeño was seen and treated in our department on 1/26/2023. Please excuse him from work 1/27/2023. He may return at his next scheduled shift.     If you have any questions or concerns, please do not hesitate to call.        _____________________________  Physician/APC Signature

## (undated) NOTE — Clinical Note
Ivonne Recinos is doing great with his sugars - I'm going to see him in person in 3 wks and he should hopefully be good to go for surgery soon after that. Ronda Roberts

## (undated) NOTE — LETTER
Date: 12/15/2021    Patient Name: Stevan Elkins          To Whom it may concern:     This letter has been written at the patient's request. The above patient was seen at one of the [facility] locations for treatment of a medical conditio

## (undated) NOTE — LETTER
Date: 4/10/2023    Patient Name: Radha Carl          To Whom it may concern: This letter has been written at the patient's request. The above patient was seen at one of the [facility] locations for treatment of a medical condition. The patient was seen today due to Knee pain, he may return to work with the following restrictions: No lifting or carrying more than 30 lbs. No Squatting, ladders or kneeling for the next 2 weeks. If you require any additional information, please contact our office.       Sincerely,      Gabby Escamilla MD

## (undated) NOTE — LETTER
Date: 1/19/2022    Patient Name: Elian Saldana          To Whom it may concern:     This letter has been written at the patient's request. The above patient was seen at one of the [facility] locations for treatment of a medical condition

## (undated) NOTE — LETTER
02/01/21  H&P REQUEST        José Antonio Rued  7/19/1965    Surgeon:Dr. Maggie Epstein    Dx:RIght knee medial meniscus tear, Chondromalacia right knee    Surgical Procedure:Right knee arthroscopy with partial medial meniscectomy and chondroplasty      Date of Surge

## (undated) NOTE — LETTER
Patient Name: Maribell Gresham  : 1965  MRN: [id number]  Patient Address: [address] [place] [address]      Coronavirus Disease 2019 (COVID-19)     [place] [address] is committed to the safety and well-being of our patients, members 2. Monitor your symptoms carefully. If your symptoms get worse, call your healthcare provider immediately. 3. Get rest and stay hydrated.    4. If you have a medical appointment, call the healthcare provider ahead of time and tell them that you have or may ? At least 24 hours have passed since recovery defined as resolution of fever without the use of fever-reducing medications; and  · Improvement in respiratory symptoms (e.g., cough, shortness of breath); and  · At least 10 days have passed since symptoms f If you would be interested in donating your plasma to help treat others diagnosed with the virus, please contact [facility] directly on their website: [email or web address]    Who is eligible to donate convalescent plasma?

## (undated) NOTE — LETTER
Date: 9/20/2021    Patient Name: Janet Sargent          To Whom it may concern:     This letter has been written at the patient's request. The above patient was seen at one of the [facility] locations for treatment of a medical condition

## (undated) NOTE — LETTER
02/01/21  Post-operative Arthroscopy    Medication: Before you leave the hospital, you will be given a prescription for a pain reliever. This medication contains a narcotic with acetaminophen (Tylenol), so do not take any additional Tylenol.     You may katlin out. Your plan for physical therapy, driving, and returning to work will be discussed at this appointment. Please don't hesitate to contact our office at [phone] if you have any post-operative questions or concerns.

## (undated) NOTE — LETTER
Patient Name: Paul Prado  YOB: 1965          MRN number:  [id number]  Date:  7/18/2022  Referring Physician:  Rubens Bragg               21st Century Cures Act Notice to Patient: Medical documents like this are made available to patients in the interest of transparency. However, be advised this is a medical document and it is intended as [id number] communication between your medical providers. This medical document may contain abbreviations, assessments, medical data, and results or other terms that are unfamiliar. Medical documents are intended to carry relevant information, facts as evident, and the clinical opinion of the practitioner. As such, this medical document may be written in language that appears blunt or direct. You are encouraged to contact your medical provider and/or [facility] if you have any questions about this medical document.

## (undated) NOTE — LETTER
Patient Name: Reagan Jama  YOB: 1965          MRN number:  [id number]  Date:  5/17/2021  Referring Physician:  Jarad Darling         POST-OP KNEE EVALUATION:    Referring Physician: Dr. Namita Thompson  Diagnosis: R knee partial medial menisectomy, findings include h/o diabetes, L knee injury, L shoulder RC repair. ASSESSMENT  Tera Fuentes presents to physical therapy evaluation with primary c/o R knee pain, stiffness and weakness.  The results of the objective tests and measures show decreased R knee ROM, to supine transfers with hooking L leg under R to help lift RLE, Ambulation with TBWB and axillary crutches to avoid decrease in terminal knee extension from constant knee flexion for gait.  Adjusted hand  on crutches to get better leverage for UE pusho Therapy, Neuromuscular Re-education, Self-Care Home Management, Therapeutic Activities, Therapeutic Exercise, Home Exercise Program instruction and Modalities to include: Electrical stimulation (unattended)    Education or treatment limitation: None  Rehab

## (undated) NOTE — LETTER
Date: 6/7/2021    Patient Name: Marivel Aguilera          To Whom it may concern: This letter has been written at the patient's request. The above patient was seen at one of the [facility] locations for treatment of a medical condition.

## (undated) NOTE — LETTER
Date & Time: 6/17/2025, 11:49 AM  Patient: Patrick Silva  Encounter Provider(s):    Jalen Hernandez MD       To Whom It May Concern:    Patrick Silva was seen and treated in our department on 6/17/2025. He should not return to work until 6/19/25.    If you have any questions or concerns, please do not hesitate to call.        _____________________________  Physician/APC Signature

## (undated) NOTE — LETTER
10/20/21  Ramiro Mack  7/19/1965  POST OP INSTRUCTIONS  1. Rest, minimize your activities. 2. Keep all splints, braces, slings, immobilizers and dressings on unless instructed differently. 3. Take pain medication as directed.   4. Keep your surgical

## (undated) NOTE — LETTER
Date: 8/12/2021    Patient Name: Danya Calloway          To Whom it may concern:     This letter has been written at the patient's request. The above patient was seen at one of the [facility] locations for treatment of a medical condition

## (undated) NOTE — Clinical Note
Saud Yepez is good to go for surgery - fasting sugar average over 30 days is 105 (in office a1c is 8.3 - from 11.4 6 wks ago). Once surgery is scheduled, have preop let me if any labs or repeat EKG is needed. I'll update my preop accordingly. Thanks.

## (undated) NOTE — LETTER
Date: 5/6/2021    Patient Name: Bea Calderon          To Whom it may concern: This letter has been written at the patient's request. The above patient was seen at one of the [facility] locations for treatment of a medical condition.

## (undated) NOTE — LETTER
Patrick Silva  [address] [facility] [address]      Dear Patrick Silva ,    Our records indicate you are now due for your annual wellness physical. Your last  annual wellness physical visit was on 4/10/2023 with Dr. Tyrone Boyle.       You may schedule directly through [employer], our [facility] website ([email or web address]) or by phone at [phone].      If you have established care with a different provider, please call our office so we can update your file to list your correct provider name and information.   Once we update your file with this information it will eliminate further phone calls and/or correspondence from our office.    Thank you for your cooperation.      Sincerely,    The office of Dr. Tyrone Boyle     [facility]  [address] [place], [address] [address] [phone]

## (undated) NOTE — LETTER
Date: 7/8/2021    Patient Name: Maribell Gresham          To Whom it may concern: This letter has been written at the patient's request. The above patient was seen at one of the [facility] locations for treatment of a medical condition.

## (undated) NOTE — LETTER
01/25/21    To Whom It May Concern:     Moises Marquez has been evaluated in the office for a scheduled appointment. Please excuse the time missed during this appointment and he is allowed to return to work with restrictions.   Restrictions:  No kneeling, crawl

## (undated) NOTE — LETTER
10/20/21  H&P REQUEST        Reagan November 7/19/1965    Surgeon:Dr. Pritesh Laboy    Dx:Left knee medial meniscus tear, Left knee chondromalacia     Surgical Procedure:Left knee arthroscopy with partial medial meniscectomy and chondroplasty       Date

## (undated) NOTE — ED AVS SNAPSHOT
Moises Marquez   MRN: [id number]    Department:  THE [facility] Emergency Department in [place]   Date of Visit:  9/29/2019           Disclosure     Insurance plans vary and the physician(s) referred by the ER may not be covered by your plan.  Please contact your tell this physician (or your personal doctor if your instructions are to return to your personal doctor) about any new or lasting problems. The primary care or specialist physician will see patients referred from the [facility] Emergency Department.  Van Colon

## (undated) NOTE — LETTER
Date & Time: 6/15/2023, 11:41 AM  Patient: Jigna Montana  Encounter Provider(s):    Cali Tian PA-C       To Whom It May Concern:    Christianne Cm was seen and treated in our department on 6/15/2023. He should not return to work until 6/19/2023 .     If you have any questions or concerns, please do not hesitate to call.        _____________________________  Physician/APC Signature

## (undated) NOTE — LETTER
Date: 12/1/2021    Patient Name: Isabella Theodore          To Whom it may concern:     This letter has been written at the patient's request. The above patient was seen at one of the [facility] locations for treatment of a medical condition

## (undated) NOTE — LETTER
Date: 11/19/2019    Patient Name: Christian Evans          To Whom it may concern: The above patient was seen at the [facility] for treatment of a medical condition.     This patient should be excused from attending workl from 11/22/2019 through

## (undated) NOTE — LETTER
12/07/20    To Whom It May Concern:     Myranda Ralgand has been evaluated in the office for a scheduled appointment. Please excuse the time missed during this appointment and he is allowed to return to work with restrictions.  These restrictions will be in plac

## (undated) NOTE — LETTER
Date: 2/1/2021    Patient Name: Ozzie Cervantes          To Whom it may concern: This letter has been written at the patient's request. The above patient was seen at one of the [address] [facility] for treatment of a medical condition.     The

## (undated) NOTE — LETTER
Date: 6/7/2021    Patient Name: Ameena Onofre          To Whom it may concern: This letter has been written at the patient's request. The above patient was seen at one of the [facility] locations for treatment of a medical condition.

## (undated) NOTE — LETTER
Date & Time: 9/29/2019, 8:06 PM  Patient: Cristóbal Resendez  Encounter Provider(s):    TIGIST Alvarenga       To Whom It May Concern:    Cristóbal Resendez was seen and treated in our department on 9/29/2019. He should not return to work until 10/1/19.     If you h